# Patient Record
Sex: FEMALE | Race: WHITE | NOT HISPANIC OR LATINO | Employment: FULL TIME | URBAN - METROPOLITAN AREA
[De-identification: names, ages, dates, MRNs, and addresses within clinical notes are randomized per-mention and may not be internally consistent; named-entity substitution may affect disease eponyms.]

---

## 2017-03-30 ENCOUNTER — ALLSCRIPTS OFFICE VISIT (OUTPATIENT)
Dept: OTHER | Facility: OTHER | Age: 32
End: 2017-03-30

## 2017-10-03 ENCOUNTER — ALLSCRIPTS OFFICE VISIT (OUTPATIENT)
Dept: OTHER | Facility: OTHER | Age: 32
End: 2017-10-03

## 2017-10-03 DIAGNOSIS — E78.5 HYPERLIPIDEMIA: ICD-10-CM

## 2017-10-03 DIAGNOSIS — I10 ESSENTIAL (PRIMARY) HYPERTENSION: ICD-10-CM

## 2017-10-04 NOTE — PROGRESS NOTES
Assessment  1  Never a smoker   2  Hypertension (401 9) (I10)   3  Hyperlipidemia (272 4) (E78 5)    Plan  Hyperlipidemia    · (1) LIPID PANEL, FASTING; Status:Active; Requested OXI:[id number];   Hyperlipidemia, Hypertension    · Follow-up visit in 6 months Evaluation and Treatment  Follow-up  Status: Hold For - Scheduling   Requested for: [id number]  Hypertension    · (1) COMPREHENSIVE METABOLIC PANEL; Status:Active; Requested HJW:[id number];     Discussion/Summary  Medication SE Review and Pt Understands Tx: Possible side effects of new medications were reviewed with the patient/guardian today  The treatment plan was reviewed with the patient/guardian  The patient/guardian understands and agrees with the treatment plan       Self Referrals:   Self Referrals: No      Chief Complaint  Chief Complaint Free Text Note Form: Pt presents for routine f/up exam  PT feels well today  No refills needed at this time  appetite is normal per patient  Chief Complaint Chronic Condition [facility]: Patient is here today for follow up of chronic conditions described in HPI  Active Problems  1  Hypertension (401 9) (I10)    Past Medical History  Active Problems And Past Medical History Reviewed: The active problems and past medical history were reviewed and updated today  Surgical History  1  History of Cholecystectomy   2  History of Incision And Drainage Of Pilonidal Cyst  Surgical History Reviewed: The surgical history was reviewed and updated today  Family History  Father    1  Family history of Hypertension (V17 49)  Maternal Grandmother    2  Family history of Coronary Artery Disease (V17 49)  Paternal Grandmother    3  Family history of Alzheimer Disease  Paternal Grandfather    4  Family history of Skin Cancer (V16 8)  Family History Reviewed: The family history was reviewed and updated today         Social History   · Denied: History of Alcohol   · Caffeine Use   · Dental care, regularly   · Denied: History of Drug Use   · Good dental hygiene   · Never a smoker   · Yazidism   · Single   · Sun Protection Sunscreen   · Uses Safety Equipment - Seatbelts  Social History Reviewed: The social history was reviewed and updated today  The social history was reviewed and is unchanged  Current Meds   1  Lisinopril-Hydrochlorothiazide 20-25 MG Oral Tablet; One po BID; Therapy: [id number] to (Last Rx:21Nov2016)  Requested for: 21Nov2016 Ordered   2  Multi-Vitamin Daily Oral Tablet; Therapy: (Recorded:71Oic1405) to Recorded   3  Potassium Chloride ER 20 MEQ Oral Tablet Extended Release; Take one tablet daily for first week,   then after first week take 2-3 times weekly; Therapy: 71ZDI7638 to (Minor Delacruz)  Requested for: 41BYW1642; Last Rx:49Xmv1996   Ordered  Medication List Reviewed: The medication list was reviewed and updated today  Allergies  1  Adhesive Tape TAPE   2  Amoxicillin TABS   3  Latex Exam Gloves MISC   4   Sulfa Drugs    Vitals  Vital Signs    Recorded: 03YHE7438 08:27AM Recorded: 53ROG4985 08:18AM   Temperature  96 9 F   Heart Rate  64   Systolic 148    Diastolic 72    Height  5 ft 8 in   Weight  208 lb 6 oz   BMI Calculated  31 68   BSA Calculated  2 08   O2 Saturation  97, RA     Signatures   Electronically signed by : Lisbet Musa DO; Oct  3 2017  8:32AM EST                       (Author)

## 2018-01-12 VITALS
BODY MASS INDEX: 31.26 KG/M2 | HEIGHT: 68 IN | WEIGHT: 206.25 LBS | SYSTOLIC BLOOD PRESSURE: 126 MMHG | DIASTOLIC BLOOD PRESSURE: 78 MMHG | HEART RATE: 103 BPM | OXYGEN SATURATION: 96 % | TEMPERATURE: 97.8 F

## 2018-01-15 VITALS
HEIGHT: 68 IN | TEMPERATURE: 96.9 F | SYSTOLIC BLOOD PRESSURE: 124 MMHG | OXYGEN SATURATION: 97 % | BODY MASS INDEX: 31.58 KG/M2 | WEIGHT: 208.38 LBS | DIASTOLIC BLOOD PRESSURE: 72 MMHG | HEART RATE: 64 BPM

## 2018-01-31 DIAGNOSIS — E87.6 HYPOKALEMIA: Primary | ICD-10-CM

## 2018-01-31 RX ORDER — POTASSIUM CHLORIDE 20 MEQ/1
TABLET, EXTENDED RELEASE ORAL
Qty: 30 TABLET | Refills: 0 | Status: SHIPPED | OUTPATIENT
Start: 2018-01-31 | End: 2018-08-01 | Stop reason: SDUPTHER

## 2018-03-17 ENCOUNTER — TRANSCRIBE ORDERS (OUTPATIENT)
Dept: LAB | Facility: MEDICAL CENTER | Age: 33
End: 2018-03-17

## 2018-03-17 ENCOUNTER — APPOINTMENT (OUTPATIENT)
Dept: LAB | Facility: MEDICAL CENTER | Age: 33
End: 2018-03-17
Payer: COMMERCIAL

## 2018-03-17 DIAGNOSIS — E78.5 HYPERLIPIDEMIA: ICD-10-CM

## 2018-03-17 DIAGNOSIS — I10 ESSENTIAL (PRIMARY) HYPERTENSION: ICD-10-CM

## 2018-03-17 LAB
ALBUMIN SERPL BCP-MCNC: 4 G/DL (ref 3.5–5)
ALP SERPL-CCNC: 53 U/L (ref 46–116)
ALT SERPL W P-5'-P-CCNC: 22 U/L (ref 12–78)
ANION GAP SERPL CALCULATED.3IONS-SCNC: 5 MMOL/L (ref 4–13)
AST SERPL W P-5'-P-CCNC: 16 U/L (ref 5–45)
BILIRUB SERPL-MCNC: 0.53 MG/DL (ref 0.2–1)
BUN SERPL-MCNC: 11 MG/DL (ref 5–25)
CALCIUM SERPL-MCNC: 8.6 MG/DL (ref 8.3–10.1)
CHLORIDE SERPL-SCNC: 101 MMOL/L (ref 100–108)
CHOLEST SERPL-MCNC: 144 MG/DL (ref 50–200)
CO2 SERPL-SCNC: 32 MMOL/L (ref 21–32)
CREAT SERPL-MCNC: 0.73 MG/DL (ref 0.6–1.3)
GFR SERPL CREATININE-BSD FRML MDRD: 109 ML/MIN/1.73SQ M
GLUCOSE P FAST SERPL-MCNC: 85 MG/DL (ref 65–99)
HDLC SERPL-MCNC: 64 MG/DL (ref 40–60)
LDLC SERPL CALC-MCNC: 71 MG/DL (ref 0–100)
POTASSIUM SERPL-SCNC: 3.4 MMOL/L (ref 3.5–5.3)
PROT SERPL-MCNC: 7.6 G/DL (ref 6.4–8.2)
SODIUM SERPL-SCNC: 138 MMOL/L (ref 136–145)
TRIGL SERPL-MCNC: 44 MG/DL

## 2018-03-17 PROCEDURE — 80053 COMPREHEN METABOLIC PANEL: CPT

## 2018-03-17 PROCEDURE — 80061 LIPID PANEL: CPT

## 2018-03-17 PROCEDURE — 36415 COLL VENOUS BLD VENIPUNCTURE: CPT

## 2018-04-03 RX ORDER — LISINOPRIL AND HYDROCHLOROTHIAZIDE 25; 20 MG/1; MG/1
TABLET ORAL 2 TIMES DAILY
COMMUNITY
Start: 2015-03-17 | End: 2018-09-28 | Stop reason: SDUPTHER

## 2018-04-04 PROBLEM — E78.5 HYPERLIPIDEMIA: Status: ACTIVE | Noted: 2017-10-03

## 2018-04-05 ENCOUNTER — TELEPHONE (OUTPATIENT)
Dept: INTERNAL MEDICINE CLINIC | Facility: CLINIC | Age: 33
End: 2018-04-05

## 2018-04-05 ENCOUNTER — OFFICE VISIT (OUTPATIENT)
Dept: INTERNAL MEDICINE CLINIC | Facility: CLINIC | Age: 33
End: 2018-04-05
Payer: COMMERCIAL

## 2018-04-05 VITALS
OXYGEN SATURATION: 97 % | WEIGHT: 221.13 LBS | HEART RATE: 82 BPM | TEMPERATURE: 97.4 F | BODY MASS INDEX: 33.51 KG/M2 | DIASTOLIC BLOOD PRESSURE: 70 MMHG | HEIGHT: 68 IN | SYSTOLIC BLOOD PRESSURE: 124 MMHG

## 2018-04-05 DIAGNOSIS — I10 ESSENTIAL HYPERTENSION: Primary | ICD-10-CM

## 2018-04-05 DIAGNOSIS — E78.5 HYPERLIPIDEMIA, UNSPECIFIED HYPERLIPIDEMIA TYPE: ICD-10-CM

## 2018-04-05 PROCEDURE — 99214 OFFICE O/P EST MOD 30 MIN: CPT | Performed by: INTERNAL MEDICINE

## 2018-04-05 NOTE — PROGRESS NOTES
Assessment/Plan:     HTN  Pt stable and will continue diet and exercise  Increase fluids and take potassium daily michael in summer    Hyperlipidemia  Labs reviewed and improved  Continue present rx and regular exercise    Increase water intake    RTO 6 months          Patient ID: Rebekah Almaguer is a 29 y o  female  HPI   Pt doing well generally  Had an episode after running half marathon in October but sxs recovered quickly after hydration  No chest pain or sob  No recent illnesses  Planning to do a warrior run in the summer  Review of Systems   Constitutional: Negative  HENT: Negative  Eyes: Negative  Respiratory: Negative for shortness of breath  Cardiovascular: Negative  Negative for chest pain and palpitations  Gastrointestinal: Negative  Endocrine: Negative  Genitourinary: Negative  Musculoskeletal: Negative  Negative for neck pain  Skin: Negative  Allergic/Immunologic: Negative  Neurological: Negative  Negative for headaches  Hematological: Negative  Psychiatric/Behavioral: Negative  Vitals:    04/05/18 0835 04/05/18 0848   BP:  124/70   Pulse: 82    Temp: (!) 97 4 °F (36 3 °C)    TempSrc: Tympanic    SpO2: 97%    Weight: 100 kg (221 lb 2 oz)    Height: 5' 8" (1 727 m)         Physical Exam   Constitutional: She is oriented to person, place, and time  She appears well-developed and well-nourished  No distress  HENT:   Head: Normocephalic and atraumatic  Right Ear: External ear normal    Left Ear: External ear normal    Nose: Nose normal    Mouth/Throat: Oropharynx is clear and moist  No oropharyngeal exudate  Eyes: Conjunctivae and EOM are normal  Pupils are equal, round, and reactive to light  No scleral icterus  Neck: Normal range of motion  Neck supple  Cardiovascular: Normal rate, regular rhythm, normal heart sounds and intact distal pulses  Pulmonary/Chest: Effort normal and breath sounds normal  No respiratory distress   She has no wheezes  She has no rales  Abdominal: Soft  Bowel sounds are normal    Musculoskeletal: Normal range of motion  She exhibits no edema, tenderness or deformity  Neurological: She is alert and oriented to person, place, and time  She has normal reflexes  She displays normal reflexes  No cranial nerve deficit  She exhibits normal muscle tone  Coordination normal    Skin: Skin is warm and dry  She is not diaphoretic  Psychiatric: She has a normal mood and affect  Her behavior is normal  Judgment and thought content normal    Nursing note and vitals reviewed

## 2018-04-30 ENCOUNTER — VBI (OUTPATIENT)
Dept: INTERNAL MEDICINE CLINIC | Facility: CLINIC | Age: 33
End: 2018-04-30

## 2018-04-30 LAB — PATH REPORT.RELEVANT HX SPEC: NORMAL

## 2018-06-18 ENCOUNTER — TELEPHONE (OUTPATIENT)
Dept: INTERNAL MEDICINE CLINIC | Facility: CLINIC | Age: 33
End: 2018-06-18

## 2018-06-18 NOTE — TELEPHONE ENCOUNTER
Can she come at 26 tomorrow and I can look at it  Tellher to use compresses and take claritin or allegra today

## 2018-06-19 ENCOUNTER — OFFICE VISIT (OUTPATIENT)
Dept: INTERNAL MEDICINE CLINIC | Facility: CLINIC | Age: 33
End: 2018-06-19
Payer: COMMERCIAL

## 2018-06-19 VITALS
HEIGHT: 68 IN | WEIGHT: 218.38 LBS | OXYGEN SATURATION: 98 % | BODY MASS INDEX: 33.1 KG/M2 | HEART RATE: 64 BPM | TEMPERATURE: 98.1 F

## 2018-06-19 DIAGNOSIS — H57.9 EYE LESION: Primary | ICD-10-CM

## 2018-06-19 PROCEDURE — 3008F BODY MASS INDEX DOCD: CPT | Performed by: INTERNAL MEDICINE

## 2018-06-19 PROCEDURE — 99213 OFFICE O/P EST LOW 20 MIN: CPT | Performed by: INTERNAL MEDICINE

## 2018-06-19 NOTE — PROGRESS NOTES
Assessment/Plan:      Diagnoses and all orders for this visit:    Eye lesion  Appears to be resolving and most likely a bite albert  It is below corner of left eye and no eye issues noted at this time  Compresses prn and use claritin for a couple days in case there was local reaction      RTO prn       Patient ID: Claudell Perking is a 29 y o  female  HPI   Pt noted raised area lower left lid area below the lid line  Some discomfort but is smaller and no vision deficit  No itch  Used compress yesterday with some relief and seems better today    Review of Systems   Constitutional: Negative  Eyes: Positive for itching  Respiratory: Negative  Cardiovascular: Negative  Musculoskeletal: Negative  Hematological: Negative  Psychiatric/Behavioral: Negative  Physical Exam   Constitutional: She is oriented to person, place, and time  She appears well-developed and well-nourished  HENT:   Head: Normocephalic  Eyes: Conjunctivae and EOM are normal  Pupils are equal, round, and reactive to light  Neck: Normal range of motion  Neck supple  Cardiovascular: Normal rate and regular rhythm  Pulmonary/Chest: Effort normal and breath sounds normal    Abdominal: Soft  Bowel sounds are normal    Neurological: She is alert and oriented to person, place, and time  She has normal reflexes  Skin: Skin is warm and dry  Psychiatric: She has a normal mood and affect   Her behavior is normal  Judgment and thought content normal

## 2018-08-01 DIAGNOSIS — E87.6 HYPOKALEMIA: ICD-10-CM

## 2018-08-01 RX ORDER — POTASSIUM CHLORIDE 20 MEQ/1
TABLET, EXTENDED RELEASE ORAL
Qty: 30 TABLET | Refills: 0 | Status: SHIPPED | OUTPATIENT
Start: 2018-08-01 | End: 2018-11-27 | Stop reason: SDUPTHER

## 2018-09-10 ENCOUNTER — TELEPHONE (OUTPATIENT)
Dept: INTERNAL MEDICINE CLINIC | Facility: CLINIC | Age: 33
End: 2018-09-10

## 2018-09-10 DIAGNOSIS — L72.9 INFECTED CYST OF SKIN: Primary | ICD-10-CM

## 2018-09-10 DIAGNOSIS — L08.9 INFECTED CYST OF SKIN: Primary | ICD-10-CM

## 2018-09-10 RX ORDER — DOXYCYCLINE 100 MG/1
100 CAPSULE ORAL 2 TIMES DAILY
Qty: 14 CAPSULE | Refills: 0 | Status: SHIPPED | OUTPATIENT
Start: 2018-09-10 | End: 2018-09-17

## 2018-09-28 DIAGNOSIS — I10 ESSENTIAL HYPERTENSION: Primary | ICD-10-CM

## 2018-09-28 RX ORDER — LISINOPRIL AND HYDROCHLOROTHIAZIDE 25; 20 MG/1; MG/1
TABLET ORAL
Qty: 100 TABLET | Refills: 0 | Status: SHIPPED | OUTPATIENT
Start: 2018-09-28 | End: 2019-01-17 | Stop reason: SDUPTHER

## 2018-10-05 ENCOUNTER — OFFICE VISIT (OUTPATIENT)
Dept: INTERNAL MEDICINE CLINIC | Facility: CLINIC | Age: 33
End: 2018-10-05
Payer: COMMERCIAL

## 2018-10-05 VITALS
TEMPERATURE: 98.3 F | DIASTOLIC BLOOD PRESSURE: 70 MMHG | HEART RATE: 70 BPM | HEIGHT: 68 IN | SYSTOLIC BLOOD PRESSURE: 124 MMHG | BODY MASS INDEX: 33.25 KG/M2 | OXYGEN SATURATION: 98 % | WEIGHT: 219.38 LBS

## 2018-10-05 DIAGNOSIS — E78.5 HYPERLIPIDEMIA, UNSPECIFIED HYPERLIPIDEMIA TYPE: ICD-10-CM

## 2018-10-05 DIAGNOSIS — I10 ESSENTIAL HYPERTENSION: Primary | ICD-10-CM

## 2018-10-05 PROCEDURE — 3078F DIAST BP <80 MM HG: CPT | Performed by: INTERNAL MEDICINE

## 2018-10-05 PROCEDURE — 99214 OFFICE O/P EST MOD 30 MIN: CPT | Performed by: INTERNAL MEDICINE

## 2018-10-05 PROCEDURE — 1036F TOBACCO NON-USER: CPT | Performed by: INTERNAL MEDICINE

## 2018-10-05 PROCEDURE — 3074F SYST BP LT 130 MM HG: CPT | Performed by: INTERNAL MEDICINE

## 2018-10-05 NOTE — PROGRESS NOTES
Assessment/Plan:         Diagnoses and all orders for this visit:    Essential hypertension  No added salt  Continue present Rx    Hyperlipidemia, unspecified hyperlipidemia type  Low fat diet  Exercise encouraged    Rto 6 months     Patient ID: Ashley Singh is a 29 y o  female  HPI   Pt feels well  No chest pain or sob  Does run for exercise  Review of Systems   Constitutional: Negative  HENT: Negative  Eyes: Negative  Respiratory: Negative  Cardiovascular: Negative  Gastrointestinal: Negative  Endocrine: Negative  Genitourinary: Negative  Musculoskeletal: Negative  Skin: Negative  Allergic/Immunologic: Negative  Neurological: Negative  Hematological: Negative  Psychiatric/Behavioral: Negative  /70   Pulse 70   Temp 98 3 °F (36 8 °C) (Tympanic)   Ht 5' 8" (1 727 m)   Wt 99 5 kg (219 lb 6 oz)   SpO2 98%   BMI 33 36 kg/m²          Physical Exam   Constitutional: She is oriented to person, place, and time  She appears well-developed and well-nourished  No distress  HENT:   Head: Normocephalic and atraumatic  Right Ear: External ear normal    Left Ear: External ear normal    Nose: Nose normal    Mouth/Throat: Oropharynx is clear and moist  No oropharyngeal exudate  Eyes: Pupils are equal, round, and reactive to light  Conjunctivae and EOM are normal  No scleral icterus  Neck: Normal range of motion  Neck supple  No JVD present  Cardiovascular: Normal rate, regular rhythm, normal heart sounds and intact distal pulses  No murmur heard  Pulmonary/Chest: Effort normal and breath sounds normal    Abdominal: Soft  Bowel sounds are normal    Musculoskeletal: Normal range of motion  She exhibits no edema, tenderness or deformity  Lymphadenopathy:     She has no cervical adenopathy  Neurological: She is alert and oriented to person, place, and time  She has normal reflexes  She displays normal reflexes  No cranial nerve deficit   She exhibits normal muscle tone  Coordination normal    Skin: Skin is warm and dry  She is not diaphoretic  Psychiatric: She has a normal mood and affect  Her behavior is normal  Judgment and thought content normal    Nursing note and vitals reviewed

## 2018-11-27 DIAGNOSIS — E87.6 HYPOKALEMIA: ICD-10-CM

## 2018-11-27 RX ORDER — POTASSIUM CHLORIDE 20 MEQ/1
TABLET, EXTENDED RELEASE ORAL
Qty: 30 TABLET | Refills: 0 | Status: SHIPPED | OUTPATIENT
Start: 2018-11-27 | End: 2019-05-09 | Stop reason: SDUPTHER

## 2019-01-17 DIAGNOSIS — I10 ESSENTIAL HYPERTENSION: ICD-10-CM

## 2019-01-17 RX ORDER — LISINOPRIL AND HYDROCHLOROTHIAZIDE 25; 20 MG/1; MG/1
1 TABLET ORAL 2 TIMES DAILY
Qty: 180 TABLET | Refills: 3 | Status: SHIPPED | OUTPATIENT
Start: 2019-01-17 | End: 2019-05-24 | Stop reason: SDUPTHER

## 2019-01-17 NOTE — TELEPHONE ENCOUNTER
From: Rita Witt  Sent: 1/17/2019 2:40 PM EST  Subject: Medication Renewal Request    Rita Witt would like a refill of the following medications:     lisinopril-hydrochlorothiazide (PRINZIDE,ZESTORETIC) 20-25 MG per tablet Vikash Tariq DO]    Preferred pharmacy: [facility] : [id number]

## 2019-04-05 ENCOUNTER — OFFICE VISIT (OUTPATIENT)
Dept: INTERNAL MEDICINE CLINIC | Facility: CLINIC | Age: 34
End: 2019-04-05
Payer: COMMERCIAL

## 2019-04-05 VITALS
BODY MASS INDEX: 32.51 KG/M2 | OXYGEN SATURATION: 98 % | HEART RATE: 75 BPM | HEIGHT: 68 IN | WEIGHT: 214.5 LBS | TEMPERATURE: 95.8 F | DIASTOLIC BLOOD PRESSURE: 78 MMHG | SYSTOLIC BLOOD PRESSURE: 124 MMHG

## 2019-04-05 DIAGNOSIS — E66.9 OBESITY (BMI 30.0-34.9): ICD-10-CM

## 2019-04-05 DIAGNOSIS — E78.5 HYPERLIPIDEMIA, UNSPECIFIED HYPERLIPIDEMIA TYPE: ICD-10-CM

## 2019-04-05 DIAGNOSIS — I10 BENIGN ESSENTIAL HYPERTENSION: Primary | ICD-10-CM

## 2019-04-05 DIAGNOSIS — Z00.00 VISIT FOR PREVENTIVE HEALTH EXAMINATION: ICD-10-CM

## 2019-04-05 PROBLEM — E66.811 OBESITY (BMI 30.0-34.9): Status: ACTIVE | Noted: 2019-04-05

## 2019-04-05 PROBLEM — H57.9 EYE LESION: Status: RESOLVED | Noted: 2018-06-19 | Resolved: 2019-04-05

## 2019-04-05 PROCEDURE — 3078F DIAST BP <80 MM HG: CPT | Performed by: INTERNAL MEDICINE

## 2019-04-05 PROCEDURE — 3008F BODY MASS INDEX DOCD: CPT | Performed by: INTERNAL MEDICINE

## 2019-04-05 PROCEDURE — 1036F TOBACCO NON-USER: CPT | Performed by: INTERNAL MEDICINE

## 2019-04-05 PROCEDURE — 99214 OFFICE O/P EST MOD 30 MIN: CPT | Performed by: INTERNAL MEDICINE

## 2019-04-05 PROCEDURE — 3074F SYST BP LT 130 MM HG: CPT | Performed by: INTERNAL MEDICINE

## 2019-05-08 ENCOUNTER — TELEPHONE (OUTPATIENT)
Dept: OTHER | Facility: OTHER | Age: 34
End: 2019-05-08

## 2019-05-08 ENCOUNTER — APPOINTMENT (OUTPATIENT)
Dept: LAB | Facility: MEDICAL CENTER | Age: 34
End: 2019-05-08
Payer: COMMERCIAL

## 2019-05-08 DIAGNOSIS — I10 BENIGN ESSENTIAL HYPERTENSION: ICD-10-CM

## 2019-05-08 DIAGNOSIS — E78.5 HYPERLIPIDEMIA, UNSPECIFIED HYPERLIPIDEMIA TYPE: ICD-10-CM

## 2019-05-08 LAB
ALBUMIN SERPL BCP-MCNC: 4 G/DL (ref 3.5–5)
ALP SERPL-CCNC: 50 U/L (ref 46–116)
ALT SERPL W P-5'-P-CCNC: 20 U/L (ref 12–78)
ANION GAP SERPL CALCULATED.3IONS-SCNC: 6 MMOL/L (ref 4–13)
AST SERPL W P-5'-P-CCNC: 17 U/L (ref 5–45)
BILIRUB SERPL-MCNC: 0.79 MG/DL (ref 0.2–1)
BUN SERPL-MCNC: 14 MG/DL (ref 5–25)
CALCIUM SERPL-MCNC: 8.8 MG/DL (ref 8.3–10.1)
CHLORIDE SERPL-SCNC: 103 MMOL/L (ref 100–108)
CHOLEST SERPL-MCNC: 136 MG/DL (ref 50–200)
CO2 SERPL-SCNC: 32 MMOL/L (ref 21–32)
CREAT SERPL-MCNC: 0.76 MG/DL (ref 0.6–1.3)
GFR SERPL CREATININE-BSD FRML MDRD: 103 ML/MIN/1.73SQ M
GLUCOSE P FAST SERPL-MCNC: 84 MG/DL (ref 65–99)
HDLC SERPL-MCNC: 52 MG/DL (ref 40–60)
LDLC SERPL CALC-MCNC: 72 MG/DL (ref 0–100)
NONHDLC SERPL-MCNC: 84 MG/DL
POTASSIUM SERPL-SCNC: 2.7 MMOL/L (ref 3.5–5.3)
PROT SERPL-MCNC: 7.2 G/DL (ref 6.4–8.2)
SODIUM SERPL-SCNC: 141 MMOL/L (ref 136–145)
TRIGL SERPL-MCNC: 58 MG/DL

## 2019-05-08 PROCEDURE — 80053 COMPREHEN METABOLIC PANEL: CPT

## 2019-05-08 PROCEDURE — 80061 LIPID PANEL: CPT

## 2019-05-08 PROCEDURE — 36415 COLL VENOUS BLD VENIPUNCTURE: CPT

## 2019-05-09 ENCOUNTER — TELEPHONE (OUTPATIENT)
Dept: INTERNAL MEDICINE CLINIC | Facility: CLINIC | Age: 34
End: 2019-05-09

## 2019-05-09 DIAGNOSIS — E87.6 LOW BLOOD POTASSIUM: Primary | ICD-10-CM

## 2019-05-09 DIAGNOSIS — E87.6 HYPOKALEMIA: ICD-10-CM

## 2019-05-09 RX ORDER — POTASSIUM CHLORIDE 20 MEQ/1
TABLET, EXTENDED RELEASE ORAL
Qty: 30 TABLET | Refills: 5 | Status: SHIPPED | OUTPATIENT
Start: 2019-05-09 | End: 2019-05-24 | Stop reason: SDUPTHER

## 2019-05-13 ENCOUNTER — APPOINTMENT (OUTPATIENT)
Dept: LAB | Facility: MEDICAL CENTER | Age: 34
End: 2019-05-13
Payer: COMMERCIAL

## 2019-05-13 DIAGNOSIS — E87.6 LOW BLOOD POTASSIUM: ICD-10-CM

## 2019-05-13 LAB — POTASSIUM SERPL-SCNC: 3 MMOL/L (ref 3.5–5.3)

## 2019-05-13 PROCEDURE — 36415 COLL VENOUS BLD VENIPUNCTURE: CPT

## 2019-05-13 PROCEDURE — 84132 ASSAY OF SERUM POTASSIUM: CPT

## 2019-05-14 ENCOUNTER — TELEPHONE (OUTPATIENT)
Dept: INTERNAL MEDICINE CLINIC | Facility: CLINIC | Age: 34
End: 2019-05-14

## 2019-05-24 DIAGNOSIS — E87.6 HYPOKALEMIA: ICD-10-CM

## 2019-05-24 DIAGNOSIS — I10 ESSENTIAL HYPERTENSION: ICD-10-CM

## 2019-05-24 RX ORDER — POTASSIUM CHLORIDE 20 MEQ/1
20 TABLET, EXTENDED RELEASE ORAL 2 TIMES DAILY
Qty: 180 TABLET | Refills: 3 | Status: SHIPPED | OUTPATIENT
Start: 2019-05-24 | End: 2019-05-28 | Stop reason: SDUPTHER

## 2019-05-24 RX ORDER — LISINOPRIL AND HYDROCHLOROTHIAZIDE 25; 20 MG/1; MG/1
1 TABLET ORAL 2 TIMES DAILY
Qty: 180 TABLET | Refills: 3 | Status: SHIPPED | OUTPATIENT
Start: 2019-05-24 | End: 2020-04-17

## 2019-05-28 DIAGNOSIS — E87.6 HYPOKALEMIA: ICD-10-CM

## 2019-05-28 RX ORDER — POTASSIUM CHLORIDE 20 MEQ/1
20 TABLET, EXTENDED RELEASE ORAL 2 TIMES DAILY
Qty: 180 TABLET | Refills: 3 | Status: SHIPPED | OUTPATIENT
Start: 2019-05-28 | End: 2020-04-21

## 2019-10-04 ENCOUNTER — OFFICE VISIT (OUTPATIENT)
Dept: INTERNAL MEDICINE CLINIC | Facility: CLINIC | Age: 34
End: 2019-10-04
Payer: COMMERCIAL

## 2019-10-04 VITALS
TEMPERATURE: 97.4 F | WEIGHT: 206.13 LBS | BODY MASS INDEX: 31.24 KG/M2 | OXYGEN SATURATION: 98 % | DIASTOLIC BLOOD PRESSURE: 78 MMHG | HEIGHT: 68 IN | SYSTOLIC BLOOD PRESSURE: 124 MMHG | HEART RATE: 68 BPM

## 2019-10-04 DIAGNOSIS — Z00.00 ENCOUNTER FOR PREVENTIVE HEALTH EXAMINATION: Primary | ICD-10-CM

## 2019-10-04 DIAGNOSIS — I10 ESSENTIAL HYPERTENSION: ICD-10-CM

## 2019-10-04 PROCEDURE — 3078F DIAST BP <80 MM HG: CPT | Performed by: INTERNAL MEDICINE

## 2019-10-04 PROCEDURE — 3074F SYST BP LT 130 MM HG: CPT | Performed by: INTERNAL MEDICINE

## 2019-10-04 PROCEDURE — 3008F BODY MASS INDEX DOCD: CPT | Performed by: INTERNAL MEDICINE

## 2019-10-04 PROCEDURE — 99214 OFFICE O/P EST MOD 30 MIN: CPT | Performed by: INTERNAL MEDICINE

## 2019-10-04 NOTE — PROGRESS NOTES
Assessment/Plan:     Preventive Health Exam  Gyn appt in January  Rx for fbw  Exercise/low sodium diet  Increase water intake  MVI daily  Deferred flu shot    Rto 6 months     Patient ID: Amaury Hensley is a 36 y o  female  HPI   Pt generally well No acute issues She did get Gyn appt for January  No chest pain or sob She is running at times and has a 5k upcoming No limiting sxs She generally feels well    Review of Systems   Constitutional: Negative  HENT: Negative  Respiratory: Negative  Cardiovascular: Negative  Gastrointestinal: Negative  Genitourinary: Negative  Musculoskeletal: Negative  Skin: Negative  Neurological: Negative  Hematological: Negative  Psychiatric/Behavioral: Negative  No past medical history on file    Past Surgical History:   Procedure Laterality Date    CHOLECYSTECTOMY  2005    PILONIDAL CYST DRAINAGE      incision and drainage; unknown     Social History     Socioeconomic History    Marital status: Single     Spouse name: Not on file    Number of children: Not on file    Years of education: Not on file    Highest education level: Not on file   Occupational History    Not on file   Social Needs    Financial resource strain: Not on file    Food insecurity:     Worry: Not on file     Inability: Not on file    Transportation needs:     Medical: Not on file     Non-medical: Not on file   Tobacco Use    Smoking status: Never Smoker    Smokeless tobacco: Never Used   Substance and Sexual Activity    Alcohol use: No    Drug use: No    Sexual activity: Not on file   Lifestyle    Physical activity:     Days per week: Not on file     Minutes per session: Not on file    Stress: Not on file   Relationships    Social connections:     Talks on phone: Not on file     Gets together: Not on file     Attends Denominational service: Not on file     Active member of club or organization: Not on file     Attends meetings of clubs or organizations: Not on file Relationship status: Not on file    Intimate partner violence:     Fear of current or ex partner: Not on file     Emotionally abused: Not on file     Physically abused: Not on file     Forced sexual activity: Not on file   Other Topics Concern    Not on file   Social History Narrative    Caffeine use    Dental care, regularly    Good dental hygiene    Gnosticism    Sun protection sunscreen    Uses safety equipment-seatbelts     Allergies   Allergen Reactions    Amoxicillin     Latex     Other      Adhesive Tape    Sulfa Antibiotics          Vitals:    10/04/19 0803 10/04/19 0819   BP:  124/78   Pulse: 68    Temp: (!) 97 4 °F (36 3 °C)    TempSrc: Tympanic    SpO2: 98%    Weight: 93 5 kg (206 lb 2 oz)    Height: 5' 8" (1 727 m)           Physical Exam   Constitutional: She is oriented to person, place, and time  She appears well-developed and well-nourished  No distress  HENT:   Head: Normocephalic and atraumatic  Right Ear: External ear normal    Left Ear: External ear normal    Nose: Nose normal    Mouth/Throat: Oropharynx is clear and moist  No oropharyngeal exudate  Eyes: Pupils are equal, round, and reactive to light  Conjunctivae and EOM are normal  No scleral icterus  Neck: Normal range of motion  Neck supple  No JVD present  Cardiovascular: Normal rate and regular rhythm  No murmur heard  Pulmonary/Chest: Effort normal and breath sounds normal  No respiratory distress  She has no wheezes  Abdominal: Soft  Bowel sounds are normal  She exhibits no distension  There is no tenderness  Musculoskeletal: Normal range of motion  She exhibits no edema  Lymphadenopathy:     She has no cervical adenopathy  Neurological: She is alert and oriented to person, place, and time  She displays normal reflexes  No cranial nerve deficit  She exhibits normal muscle tone  Coordination normal    Skin: Skin is warm and dry  Capillary refill takes less than 2 seconds  She is not diaphoretic     Psychiatric: She has a normal mood and affect  Her behavior is normal  Judgment and thought content normal    Nursing note and vitals reviewed

## 2019-12-03 NOTE — TELEPHONE ENCOUNTER
----- Message from Foreign Cowan DO sent at 12/3/2019  5:57 PM EST -----  Regarding: FW: Prescription Question  Contact: [phone]  uSjeychristel Yanirada to send rx for bleph 10 eye drops 2 drops tid   ----- Message -----  From: Patrick Downing  Sent: 12/3/2019   5:43 PM EST  To: Foreign Cowan DO  Subject: FW: Prescription Question                            ----- Message -----  From: Jeimy Mcclain  Sent: 12/3/2019   5:31 PM EST  To: [facility]  Subject: Prescription Question                            I have had a stye for the past week or two  I thought it would go away on it's own   and it is much better, but I am going on vacation next week and I don't want to have any problems with it while I am away  Is it possible to have a prescription called in for it? I'm sorry I wasn't able to call during office hours to ask about this  Thanks!   Madhu Vargas

## 2019-12-18 ENCOUNTER — TELEPHONE (OUTPATIENT)
Dept: OBGYN CLINIC | Facility: MEDICAL CENTER | Age: 34
End: 2019-12-18

## 2020-04-17 DIAGNOSIS — I10 ESSENTIAL HYPERTENSION: ICD-10-CM

## 2020-04-17 RX ORDER — LISINOPRIL AND HYDROCHLOROTHIAZIDE 25; 20 MG/1; MG/1
TABLET ORAL
Qty: 180 TABLET | Refills: 3 | Status: SHIPPED | OUTPATIENT
Start: 2020-04-17 | End: 2021-05-02

## 2020-04-21 DIAGNOSIS — E87.6 HYPOKALEMIA: ICD-10-CM

## 2020-04-21 RX ORDER — POTASSIUM CHLORIDE 20 MEQ/1
TABLET, EXTENDED RELEASE ORAL
Qty: 180 TABLET | Refills: 3 | Status: SHIPPED | OUTPATIENT
Start: 2020-04-21 | End: 2021-04-16

## 2020-05-16 ENCOUNTER — OFFICE VISIT (OUTPATIENT)
Dept: URGENT CARE | Facility: CLINIC | Age: 35
End: 2020-05-16
Payer: COMMERCIAL

## 2020-05-16 VITALS
HEART RATE: 87 BPM | WEIGHT: 206 LBS | TEMPERATURE: 99 F | BODY MASS INDEX: 31.22 KG/M2 | SYSTOLIC BLOOD PRESSURE: 148 MMHG | RESPIRATION RATE: 16 BRPM | DIASTOLIC BLOOD PRESSURE: 96 MMHG | HEIGHT: 68 IN | OXYGEN SATURATION: 100 %

## 2020-05-16 DIAGNOSIS — Z23 NEED FOR VACCINATION: ICD-10-CM

## 2020-05-16 DIAGNOSIS — S93.402A SPRAIN OF LEFT ANKLE, UNSPECIFIED LIGAMENT, INITIAL ENCOUNTER: ICD-10-CM

## 2020-05-16 DIAGNOSIS — S80.211A ABRASION, RIGHT KNEE, INITIAL ENCOUNTER: Primary | ICD-10-CM
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
## 2020-05-16 PROCEDURE — 90715 TDAP VACCINE 7 YRS/> IM: CPT | Performed by: PHYSICIAN ASSISTANT

## 2020-05-16 PROCEDURE — 99213 OFFICE O/P EST LOW 20 MIN: CPT | Performed by: PHYSICIAN ASSISTANT

## 2020-05-16 PROCEDURE — 90471 IMMUNIZATION ADMIN: CPT | Performed by: PHYSICIAN ASSISTANT

## 2020-05-16 PROCEDURE — 90715 TDAP VACCINE 7 YRS/> IM: CPT

## 2020-05-21 ENCOUNTER — OFFICE VISIT (OUTPATIENT)
Dept: INTERNAL MEDICINE CLINIC | Facility: CLINIC | Age: 35
End: 2020-05-21
Payer: COMMERCIAL

## 2020-05-21 VITALS
OXYGEN SATURATION: 98 % | DIASTOLIC BLOOD PRESSURE: 78 MMHG | TEMPERATURE: 98.5 F | SYSTOLIC BLOOD PRESSURE: 128 MMHG | BODY MASS INDEX: 37.17 KG/M2 | WEIGHT: 245.25 LBS | HEIGHT: 68 IN | HEART RATE: 66 BPM

## 2020-05-21 DIAGNOSIS — I10 BENIGN ESSENTIAL HYPERTENSION: ICD-10-CM

## 2020-05-21 DIAGNOSIS — S80.811D INFECTED ABRASION OF RIGHT LOWER EXTREMITY, SUBSEQUENT ENCOUNTER: Primary | ICD-10-CM

## 2020-05-21 DIAGNOSIS — L08.9 INFECTED ABRASION OF RIGHT LOWER EXTREMITY, SUBSEQUENT ENCOUNTER: Primary | ICD-10-CM

## 2020-05-21 PROBLEM — S80.811A INFECTED ABRASION OF RIGHT LEG: Status: ACTIVE | Noted: 2020-05-21

## 2020-05-21 PROCEDURE — 3074F SYST BP LT 130 MM HG: CPT | Performed by: INTERNAL MEDICINE

## 2020-05-21 PROCEDURE — 3078F DIAST BP <80 MM HG: CPT | Performed by: INTERNAL MEDICINE

## 2020-05-21 PROCEDURE — 3008F BODY MASS INDEX DOCD: CPT | Performed by: INTERNAL MEDICINE

## 2020-05-21 PROCEDURE — 99213 OFFICE O/P EST LOW 20 MIN: CPT | Performed by: INTERNAL MEDICINE

## 2020-05-21 PROCEDURE — 1036F TOBACCO NON-USER: CPT | Performed by: INTERNAL MEDICINE

## 2020-05-21 RX ORDER — DOXYCYCLINE HYCLATE 100 MG/1
100 CAPSULE ORAL EVERY 12 HOURS SCHEDULED
Qty: 14 CAPSULE | Refills: 0 | Status: SHIPPED | OUTPATIENT
Start: 2020-05-21 | End: 2020-05-28

## 2020-05-29 ENCOUNTER — TELEMEDICINE (OUTPATIENT)
Dept: INTERNAL MEDICINE CLINIC | Facility: CLINIC | Age: 35
End: 2020-05-29
Payer: COMMERCIAL

## 2020-05-29 ENCOUNTER — TELEPHONE (OUTPATIENT)
Dept: OTHER | Facility: OTHER | Age: 35
End: 2020-05-29

## 2020-05-29 DIAGNOSIS — S80.811D INFECTED ABRASION OF RIGHT LOWER EXTREMITY, SUBSEQUENT ENCOUNTER: Primary | ICD-10-CM

## 2020-05-29 DIAGNOSIS — L08.9 INFECTED ABRASION OF RIGHT LOWER EXTREMITY, SUBSEQUENT ENCOUNTER: Primary | ICD-10-CM

## 2020-05-29 PROCEDURE — 99213 OFFICE O/P EST LOW 20 MIN: CPT | Performed by: INTERNAL MEDICINE

## 2020-06-02 DIAGNOSIS — L08.9 WOUND INFECTION: Primary | ICD-10-CM

## 2020-06-02 DIAGNOSIS — T14.8XXA WOUND INFECTION: Primary | ICD-10-CM

## 2020-06-02 RX ORDER — DOXYCYCLINE HYCLATE 100 MG/1
100 CAPSULE ORAL EVERY 12 HOURS SCHEDULED
Qty: 14 CAPSULE | Refills: 0 | Status: SHIPPED | OUTPATIENT
Start: 2020-06-02 | End: 2020-06-05 | Stop reason: SDUPTHER

## 2020-06-05 ENCOUNTER — OFFICE VISIT (OUTPATIENT)
Dept: INTERNAL MEDICINE CLINIC | Facility: CLINIC | Age: 35
End: 2020-06-05
Payer: COMMERCIAL

## 2020-06-05 VITALS
TEMPERATURE: 99.6 F | SYSTOLIC BLOOD PRESSURE: 124 MMHG | BODY MASS INDEX: 37.19 KG/M2 | HEIGHT: 68 IN | DIASTOLIC BLOOD PRESSURE: 78 MMHG | WEIGHT: 245.38 LBS | HEART RATE: 87 BPM | OXYGEN SATURATION: 97 %

## 2020-06-05 DIAGNOSIS — T14.8XXA WOUND INFECTION: ICD-10-CM

## 2020-06-05 DIAGNOSIS — E78.5 HYPERLIPIDEMIA, UNSPECIFIED HYPERLIPIDEMIA TYPE: ICD-10-CM

## 2020-06-05 DIAGNOSIS — Z00.00 ANNUAL PHYSICAL EXAM: ICD-10-CM

## 2020-06-05 DIAGNOSIS — Z00.00 VISIT FOR PREVENTIVE HEALTH EXAMINATION: Primary | ICD-10-CM

## 2020-06-05 DIAGNOSIS — L08.9 WOUND INFECTION: ICD-10-CM

## 2020-06-05 PROCEDURE — 99395 PREV VISIT EST AGE 18-39: CPT | Performed by: INTERNAL MEDICINE

## 2020-06-05 RX ORDER — DOXYCYCLINE HYCLATE 100 MG/1
100 CAPSULE ORAL EVERY 12 HOURS SCHEDULED
Qty: 14 CAPSULE | Refills: 0 | Status: SHIPPED | OUTPATIENT
Start: 2020-06-05 | End: 2020-06-12

## 2020-06-09 ENCOUNTER — APPOINTMENT (OUTPATIENT)
Dept: LAB | Facility: MEDICAL CENTER | Age: 35
End: 2020-06-09
Payer: COMMERCIAL

## 2020-06-09 DIAGNOSIS — I10 ESSENTIAL HYPERTENSION: ICD-10-CM

## 2020-06-09 DIAGNOSIS — E78.5 HYPERLIPIDEMIA, UNSPECIFIED HYPERLIPIDEMIA TYPE: ICD-10-CM

## 2020-06-09 LAB
ALBUMIN SERPL BCP-MCNC: 4 G/DL (ref 3.5–5)
ALP SERPL-CCNC: 59 U/L (ref 46–116)
ALT SERPL W P-5'-P-CCNC: 57 U/L (ref 12–78)
ANION GAP SERPL CALCULATED.3IONS-SCNC: 6 MMOL/L (ref 4–13)
AST SERPL W P-5'-P-CCNC: 32 U/L (ref 5–45)
BILIRUB SERPL-MCNC: 0.63 MG/DL (ref 0.2–1)
BUN SERPL-MCNC: 10 MG/DL (ref 5–25)
CALCIUM SERPL-MCNC: 9 MG/DL (ref 8.3–10.1)
CHLORIDE SERPL-SCNC: 101 MMOL/L (ref 100–108)
CHOLEST SERPL-MCNC: 144 MG/DL (ref 50–200)
CO2 SERPL-SCNC: 31 MMOL/L (ref 21–32)
CREAT SERPL-MCNC: 0.69 MG/DL (ref 0.6–1.3)
GFR SERPL CREATININE-BSD FRML MDRD: 114 ML/MIN/1.73SQ M
GLUCOSE P FAST SERPL-MCNC: 88 MG/DL (ref 65–99)
HDLC SERPL-MCNC: 58 MG/DL
LDLC SERPL CALC-MCNC: 71 MG/DL (ref 0–100)
LDLC SERPL DIRECT ASSAY-MCNC: 74 MG/DL (ref 0–100)
NONHDLC SERPL-MCNC: 86 MG/DL
POTASSIUM SERPL-SCNC: 2.9 MMOL/L (ref 3.5–5.3)
PROT SERPL-MCNC: 7.6 G/DL (ref 6.4–8.2)
SODIUM SERPL-SCNC: 138 MMOL/L (ref 136–145)
TRIGL SERPL-MCNC: 73 MG/DL

## 2020-06-09 PROCEDURE — 83721 ASSAY OF BLOOD LIPOPROTEIN: CPT

## 2020-06-09 PROCEDURE — 36415 COLL VENOUS BLD VENIPUNCTURE: CPT

## 2020-06-09 PROCEDURE — 80053 COMPREHEN METABOLIC PANEL: CPT

## 2020-06-09 PROCEDURE — 80061 LIPID PANEL: CPT

## 2020-06-10 ENCOUNTER — TELEPHONE (OUTPATIENT)
Dept: INTERNAL MEDICINE CLINIC | Facility: CLINIC | Age: 35
End: 2020-06-10

## 2020-06-10 DIAGNOSIS — E87.6 HYPOKALEMIA: Primary | ICD-10-CM

## 2020-06-19 ENCOUNTER — APPOINTMENT (OUTPATIENT)
Dept: LAB | Facility: MEDICAL CENTER | Age: 35
End: 2020-06-19
Payer: COMMERCIAL

## 2020-06-19 DIAGNOSIS — E87.6 HYPOKALEMIA: ICD-10-CM

## 2020-06-19 LAB — POTASSIUM SERPL-SCNC: 3.4 MMOL/L (ref 3.5–5.3)

## 2020-06-19 PROCEDURE — 84132 ASSAY OF SERUM POTASSIUM: CPT

## 2020-06-19 PROCEDURE — 36415 COLL VENOUS BLD VENIPUNCTURE: CPT

## 2020-07-06 ENCOUNTER — OFFICE VISIT (OUTPATIENT)
Dept: OBGYN CLINIC | Facility: CLINIC | Age: 35
End: 2020-07-06
Payer: COMMERCIAL

## 2020-07-06 VITALS
BODY MASS INDEX: 36.83 KG/M2 | HEIGHT: 68 IN | DIASTOLIC BLOOD PRESSURE: 100 MMHG | SYSTOLIC BLOOD PRESSURE: 130 MMHG | WEIGHT: 243 LBS

## 2020-07-06 DIAGNOSIS — Z01.419 WOMEN'S ANNUAL ROUTINE GYNECOLOGICAL EXAMINATION: Primary | ICD-10-CM

## 2020-07-06 PROCEDURE — 3008F BODY MASS INDEX DOCD: CPT | Performed by: OBSTETRICS & GYNECOLOGY

## 2020-07-06 PROCEDURE — 3080F DIAST BP >= 90 MM HG: CPT | Performed by: OBSTETRICS & GYNECOLOGY

## 2020-07-06 PROCEDURE — 3075F SYST BP GE 130 - 139MM HG: CPT | Performed by: OBSTETRICS & GYNECOLOGY

## 2020-07-06 PROCEDURE — 99385 PREV VISIT NEW AGE 18-39: CPT | Performed by: OBSTETRICS & GYNECOLOGY

## 2020-07-06 PROCEDURE — G0145 SCR C/V CYTO,THINLAYER,RESCR: HCPCS | Performed by: OBSTETRICS & GYNECOLOGY

## 2020-07-06 PROCEDURE — 87624 HPV HI-RISK TYP POOLED RSLT: CPT | Performed by: OBSTETRICS & GYNECOLOGY

## 2020-07-06 NOTE — PROGRESS NOTES
A/P    1  Annual exam    Last PAP 5/2015 Care everywhere    Next due today only the brush secondary to not being  able to tolerate the speculum    Scheduling of pap discussed in detail - 5 years       30 y  o po ,Patient's last menstrual period was 06/22/2020 (approximate)  Not sexually active  No contraception     C/O no gyn complaints  Normal cycles no pain or discharge       Past medical / social / surgical / family history reviewed and updated   Medication and allergies discussed in detail and updated     Review of Systems - History obtained from chart review and the patient  General ROS: negative  Psychological ROS: negative  Ophthalmic ROS: negative  ENT ROS: negative  Allergy and Immunology ROS: negative  Hematological and Lymphatic ROS: negative  Endocrine ROS: negative  Breast ROS: negative for breast lumps  Respiratory ROS: no cough, shortness of breath, or wheezing  Cardiovascular ROS: no chest pain or dyspnea on exertion  Gastrointestinal ROS: no abdominal pain, change in bowel habits, or black or bloody stools  Genito-Urinary ROS: no dysuria, trouble voiding, or hematuria  Musculoskeletal ROS: negative  Neurological ROS: no TIA or stroke symptoms  Dermatological ROS: negative        Physical Exam   Constitutional: She is oriented to person, place, and time  She appears well-developed  Eyes: EOM are normal    Neck: Normal range of motion  No thyromegaly present  Cardiovascular: Normal rate and normal heart sounds  Pulmonary/Chest: Effort normal  No accessory muscle usage  No respiratory distress  She exhibits no tenderness  Right breast exhibits no inverted nipple, no mass and no tenderness  Left breast exhibits no inverted nipple, no mass and no tenderness  No breast tenderness or discharge  Breasts are symmetrical    Abdominal: Soft  She exhibits no distension  There is no tenderness  There is no rebound, no guarding and no CVA tenderness     Genitourinary: Vagina normal and uterus normal  Rectal exam shows no external hemorrhoid  No breast tenderness or discharge  Pelvic exam was performed with patient supine  No labial fusion  There is no rash, tenderness, lesion or injury on the right labia  There is no rash, tenderness, lesion or injury on the left labia  Uterus is not enlarged and not fixed  Cervix exhibits no motion tenderness and no discharge  Right adnexum displays no mass, no tenderness and no fullness  Left adnexum displays no mass, no tenderness and no fullness  No bleeding in the vagina  No foreign body in the vagina  No vaginal discharge found  Lymphadenopathy:     She has no cervical adenopathy  Right: No inguinal and no supraclavicular adenopathy present  Left: No inguinal and no supraclavicular adenopathy present  Neurological: She is alert and oriented to person, place, and time  Skin: Skin is intact  Psychiatric: She has a normal mood and affect  Her speech is normal and behavior is normal  Judgment and thought content normal    Vitals reviewed

## 2020-07-10 LAB
HPV HR 12 DNA CVX QL NAA+PROBE: NEGATIVE
HPV16 DNA CVX QL NAA+PROBE: NEGATIVE
HPV18 DNA CVX QL NAA+PROBE: NEGATIVE
LAB AP GYN PRIMARY INTERPRETATION: NORMAL
Lab: NORMAL

## 2020-11-01 ENCOUNTER — OFFICE VISIT (OUTPATIENT)
Dept: URGENT CARE | Facility: CLINIC | Age: 35
End: 2020-11-01
Payer: COMMERCIAL

## 2020-11-01 ENCOUNTER — NURSE TRIAGE (OUTPATIENT)
Dept: OTHER | Facility: OTHER | Age: 35
End: 2020-11-01

## 2020-11-01 VITALS — TEMPERATURE: 99.2 F | OXYGEN SATURATION: 98 % | HEART RATE: 88 BPM | RESPIRATION RATE: 18 BRPM

## 2020-11-01 DIAGNOSIS — U07.1 COVID-19 DETERMINED BY CLINICAL DIAGNOSTIC CRITERIA: Primary | ICD-10-CM

## 2020-11-01 DIAGNOSIS — Z11.59 SCREENING FOR VIRAL DISEASE: Primary | ICD-10-CM

## 2020-11-01 DIAGNOSIS — J06.9 VIRAL UPPER RESPIRATORY TRACT INFECTION: ICD-10-CM

## 2020-11-01 PROCEDURE — 99213 OFFICE O/P EST LOW 20 MIN: CPT | Performed by: PHYSICIAN ASSISTANT

## 2020-11-01 PROCEDURE — U0003 INFECTIOUS AGENT DETECTION BY NUCLEIC ACID (DNA OR RNA); SEVERE ACUTE RESPIRATORY SYNDROME CORONAVIRUS 2 (SARS-COV-2) (CORONAVIRUS DISEASE [COVID-19]), AMPLIFIED PROBE TECHNIQUE, MAKING USE OF HIGH THROUGHPUT TECHNOLOGIES AS DESCRIBED BY CMS-2020-01-R: HCPCS | Performed by: PHYSICIAN ASSISTANT

## 2020-11-03 LAB — SARS-COV-2 RNA SPEC QL NAA+PROBE: NOT DETECTED

## 2020-12-10 ENCOUNTER — OFFICE VISIT (OUTPATIENT)
Dept: INTERNAL MEDICINE CLINIC | Facility: CLINIC | Age: 35
End: 2020-12-10
Payer: COMMERCIAL

## 2020-12-10 VITALS
BODY MASS INDEX: 37 KG/M2 | DIASTOLIC BLOOD PRESSURE: 78 MMHG | TEMPERATURE: 97.6 F | HEIGHT: 68 IN | WEIGHT: 244.13 LBS | SYSTOLIC BLOOD PRESSURE: 124 MMHG

## 2020-12-10 DIAGNOSIS — I10 BENIGN ESSENTIAL HYPERTENSION: Primary | ICD-10-CM

## 2020-12-10 DIAGNOSIS — E78.5 HYPERLIPIDEMIA, UNSPECIFIED HYPERLIPIDEMIA TYPE: ICD-10-CM

## 2020-12-10 PROBLEM — E66.811 OBESITY (BMI 30.0-34.9): Status: RESOLVED | Noted: 2019-04-05 | Resolved: 2020-12-10

## 2020-12-10 PROBLEM — E66.9 OBESITY (BMI 30.0-34.9): Status: RESOLVED | Noted: 2019-04-05 | Resolved: 2020-12-10

## 2020-12-10 PROCEDURE — 99214 OFFICE O/P EST MOD 30 MIN: CPT | Performed by: INTERNAL MEDICINE

## 2020-12-10 PROCEDURE — 3078F DIAST BP <80 MM HG: CPT | Performed by: INTERNAL MEDICINE

## 2020-12-10 PROCEDURE — 1036F TOBACCO NON-USER: CPT | Performed by: INTERNAL MEDICINE

## 2020-12-10 PROCEDURE — 3725F SCREEN DEPRESSION PERFORMED: CPT | Performed by: INTERNAL MEDICINE

## 2020-12-10 PROCEDURE — 3074F SYST BP LT 130 MM HG: CPT | Performed by: INTERNAL MEDICINE

## 2020-12-10 PROCEDURE — 3008F BODY MASS INDEX DOCD: CPT | Performed by: INTERNAL MEDICINE

## 2021-04-16 DIAGNOSIS — E87.6 HYPOKALEMIA: ICD-10-CM

## 2021-04-16 RX ORDER — POTASSIUM CHLORIDE 20 MEQ/1
20 TABLET, EXTENDED RELEASE ORAL DAILY
Qty: 90 TABLET | Refills: 3 | Status: SHIPPED | OUTPATIENT
Start: 2021-04-16 | End: 2022-04-11

## 2021-05-02 DIAGNOSIS — I10 ESSENTIAL HYPERTENSION: ICD-10-CM

## 2021-05-02 RX ORDER — LISINOPRIL AND HYDROCHLOROTHIAZIDE 25; 20 MG/1; MG/1
TABLET ORAL
Qty: 180 TABLET | Refills: 3 | Status: SHIPPED | OUTPATIENT
Start: 2021-05-02 | End: 2022-04-27

## 2021-05-10 ENCOUNTER — TELEPHONE (OUTPATIENT)
Dept: INTERNAL MEDICINE CLINIC | Facility: CLINIC | Age: 36
End: 2021-05-10

## 2021-05-10 ENCOUNTER — TELEMEDICINE (OUTPATIENT)
Dept: INTERNAL MEDICINE CLINIC | Facility: CLINIC | Age: 36
End: 2021-05-10
Payer: COMMERCIAL

## 2021-05-10 DIAGNOSIS — B34.9 VIRAL INFECTION, UNSPECIFIED: Primary | ICD-10-CM

## 2021-05-10 PROBLEM — L08.9 INFECTED ABRASION OF RIGHT LEG: Status: RESOLVED | Noted: 2020-05-21 | Resolved: 2021-05-10

## 2021-05-10 PROBLEM — S80.811A INFECTED ABRASION OF RIGHT LEG: Status: RESOLVED | Noted: 2020-05-21 | Resolved: 2021-05-10

## 2021-05-10 PROCEDURE — U0003 INFECTIOUS AGENT DETECTION BY NUCLEIC ACID (DNA OR RNA); SEVERE ACUTE RESPIRATORY SYNDROME CORONAVIRUS 2 (SARS-COV-2) (CORONAVIRUS DISEASE [COVID-19]), AMPLIFIED PROBE TECHNIQUE, MAKING USE OF HIGH THROUGHPUT TECHNOLOGIES AS DESCRIBED BY CMS-2020-01-R: HCPCS | Performed by: INTERNAL MEDICINE

## 2021-05-10 PROCEDURE — 99213 OFFICE O/P EST LOW 20 MIN: CPT | Performed by: INTERNAL MEDICINE

## 2021-05-10 PROCEDURE — U0005 INFEC AGEN DETEC AMPLI PROBE: HCPCS | Performed by: INTERNAL MEDICINE

## 2021-05-10 NOTE — PROGRESS NOTES
COVID-19 Outpatient Progress Note    Assessment/Plan:    Problem List Items Addressed This Visit     None      Visit Diagnoses     Viral infection, unspecified    -  Primary    Relevant Orders    Novel Coronavirus (Covid-19),PCR [facility] - Collected in Office       Swab today  She is quarantining as sxs are suspicious for covid and she is unvaccinated at this time  Increase fluids, alternate Tylenol and ibuprofen for headache, rest  Will call pt when results available She did test at [facility] yesterday and results Pending   Disposition:     I recommended the patient to come to our office to perform PCR testing for COVID-19  I have spent 10 minutes directly with the patient  Greater than 50% of this time was spent in counseling/coordination of care regarding: diagnostic results, prognosis, risks and benefits of treatment options and instructions for management  Encounter provider Cass Van DO    Provider located at [address] [place] [id number]    Recent Visits  No visits were found meeting these conditions  Showing recent visits within past 7 days and meeting all other requirements     Today's Visits  Date Type Provider Dept   05/10/21 Telemedicine Cass Van DO [facility]   05/10/21 Telephone [address] [facility] [address] [place] [address] today's visits and meeting all other requirements     Future Appointments  No visits were found meeting these conditions  Showing future appointments within next 150 days and meeting all other requirements        Patient agrees to participate in a virtual check in via telephone or video visit instead of presenting to the office to address urgent/immediate medical needs  Patient is aware this is a billable service  After connecting through Telephone, the patient was identified by name and date of birth   Josephreta Arzola was informed that this was a telemedicine visit and that the exam was being conducted confidentially over secure lines  My office door was closed  No one else was in the room  Facundo Cobos acknowledged consent and understanding of privacy and security of the telemedicine visit  I informed the patient that I have reviewed her record in Epic and presented the opportunity for her to ask any questions regarding the visit today  The patient agreed to participate  It was my intent to perform this visit via video technology but the patient was not able to do a video connection so the visit was completed via audio telephone only  Subjective:   Facundo Cobos is a 29 y o  female who is concerned about COVID-19  Patient's symptoms include fever, fatigue and headache  Patient denies rhinorrhea, sore throat, anosmia, loss of taste, cough, shortness of breath and chest tightness       Date of symptom onset: 5/8/2021    Exposure:   Contact with a person who is under investigation (PUI) for or who is positive for COVID-19 within the last 14 days?: No    Hospitalized recently for fever and/or lower respiratory symptoms?: No      Currently a healthcare worker that is involved in direct patient care?: No      Works in a special setting where the risk of COVID-19 transmission may be high? (this may include long-term care, correctional and snf facilities; homeless shelters; assisted-living facilities and group homes ): No      Resident in a special setting where the risk of COVID-19 transmission may be high? (this may include long-term care, correctional and snf facilities; homeless shelters; assisted-living facilities and group homes ): No      P unsure of any direct exposure had headache, fatigue, fever and headache unlike headaches she has had in past She is more tired No sinus sxs reported and no ill contacts she is aware of     Lab Results   Component Value Date    Aissatou Kang Not Detected 11/01/2020     Past Medical History:   Diagnosis Date    Hypertension      Past Surgical History:   Procedure Laterality Date    CHOLECYSTECTOMY  2005    PILONIDAL CYST DRAINAGE      incision and drainage; unknown     Current Outpatient Medications   Medication Sig Dispense Refill    lisinopril-hydrochlorothiazide (PRINZIDE,ZESTORETIC) 20-25 MG per tablet TAKE 1 TABLET TWICE A  tablet 3    Multiple Vitamin (MULTI-VITAMIN DAILY PO) Take by mouth      potassium chloride (Klor-Con M20) 20 mEq tablet Take 1 tablet (20 mEq total) by mouth daily 90 tablet 3     No current facility-administered medications for this visit  Allergies   Allergen Reactions    Amoxicillin     Latex     Other      Adhesive Tape    Sulfa Antibiotics        Review of Systems   Constitutional: Positive for fatigue and fever  HENT: Negative for rhinorrhea and sore throat  Respiratory: Negative for cough, chest tightness and shortness of breath  Neurological: Positive for headaches  Objective: There were no vitals filed for this visit  Physical Exam  Constitutional:       Appearance: She is not diaphoretic  Pulmonary:      Effort: Pulmonary effort is normal  No respiratory distress  Neurological:      Mental Status: She is alert  Psychiatric:         Mood and Affect: Mood normal          Behavior: Behavior normal          Thought Content: Thought content normal          Judgment: Judgment normal        VIRTUAL VISIT DISCLAIMER    Manan Causey acknowledges that she has consented to an online visit or consultation  She understands that the online visit is based solely on information provided by her, and that, in the absence of a face-to-face physical evaluation by the physician, the diagnosis she receives is both limited and provisional in terms of accuracy and completeness  This is not intended to replace a full medical face-to-face evaluation by the physician  Manan Causey understands and accepts these terms

## 2021-05-10 NOTE — TELEPHONE ENCOUNTER
I will do phone visit at 11:45 today  She can take Tylenol every 4-5 hours and schedule it Increase fluids  I would recommend she consider getting swab here today as well to have PCR confirmation and she might be a candidate for monoclonal antibody infusion to help sxs - need to have pcr test to evlauate and sxs onset within 7 days

## 2021-05-10 NOTE — TELEPHONE ENCOUNTER
Pt called she was tested for covid yesterday at [facility] and has been experiencing headache and fever at this point  She said it started Saturday with the headache  It was as high as 105 this am  She has been taking migraine med  Her fever broke she said but is asking what she should be taking  Please advise

## 2021-05-11 DIAGNOSIS — B34.9 VIRAL INFECTION, UNSPECIFIED: Primary | ICD-10-CM

## 2021-05-11 LAB — SARS-COV-2 RNA RESP QL NAA+PROBE: NEGATIVE

## 2021-05-11 RX ORDER — AZITHROMYCIN 250 MG/1
TABLET, FILM COATED ORAL
Qty: 6 TABLET | Refills: 0 | Status: SHIPPED | OUTPATIENT
Start: 2021-05-11 | End: 2021-05-15

## 2021-05-11 NOTE — TELEPHONE ENCOUNTER
zpack 5 day   Mucinex dm bid  Would stay home from work until sxs improve - maybe the remainder of the week

## 2021-05-11 NOTE — TELEPHONE ENCOUNTER
Pt is aware of results however she still has a fever, side of face pain next to her ear behind her ear  She's also coughing an has congestion         Amoxicillin Not Specified     Latex Not Specified     Other Not Specified  Adhesive Tape   Sulfa Antibiotics        Galilea

## 2021-05-12 ENCOUNTER — TELEPHONE (OUTPATIENT)
Dept: INTERNAL MEDICINE CLINIC | Facility: CLINIC | Age: 36
End: 2021-05-12

## 2021-05-12 ENCOUNTER — HOSPITAL ENCOUNTER (EMERGENCY)
Facility: HOSPITAL | Age: 36
Discharge: HOME | End: 2021-05-12
Attending: EMERGENCY MEDICINE | Admitting: EMERGENCY MEDICINE
Payer: COMMERCIAL

## 2021-05-12 VITALS
OXYGEN SATURATION: 97 % | RESPIRATION RATE: 18 BRPM | HEART RATE: 71 BPM | TEMPERATURE: 98.5 F | WEIGHT: 248.9 LBS | BODY MASS INDEX: 36.87 KG/M2 | DIASTOLIC BLOOD PRESSURE: 86 MMHG | SYSTOLIC BLOOD PRESSURE: 141 MMHG | HEIGHT: 69 IN

## 2021-05-12 DIAGNOSIS — E87.6 HYPOKALEMIA: ICD-10-CM

## 2021-05-12 DIAGNOSIS — R51.9 HEADACHE: Primary | ICD-10-CM

## 2021-05-12 DIAGNOSIS — G50.0 TRIGEMINAL NEURALGIA OF RIGHT SIDE OF FACE: ICD-10-CM

## 2021-05-12 LAB
ANION GAP SERPL CALCULATED.3IONS-SCNC: 6 MMOL/L (ref 4–13)
BASOPHILS # BLD MANUAL: 0 THOUSAND/UL (ref 0–0.1)
BASOPHILS NFR MAR MANUAL: 0 % (ref 0–1)
BUN SERPL-MCNC: 6 MG/DL (ref 5–25)
CALCIUM SERPL-MCNC: 9.1 MG/DL (ref 8.3–10.1)
CHLORIDE SERPL-SCNC: 94 MMOL/L (ref 100–108)
CO2 SERPL-SCNC: 37 MMOL/L (ref 21–32)
CREAT SERPL-MCNC: 0.87 MG/DL (ref 0.6–1.3)
EOSINOPHIL # BLD MANUAL: 0.04 THOUSAND/UL (ref 0–0.4)
EOSINOPHIL NFR BLD MANUAL: 1 % (ref 0–6)
ERYTHROCYTE [DISTWIDTH] IN BLOOD BY AUTOMATED COUNT: 13 % (ref 11.6–15.1)
EXT PREG TEST URINE: NEGATIVE
EXT. CONTROL ED NAV: NORMAL
GFR SERPL CREATININE-BSD FRML MDRD: 87 ML/MIN/1.73SQ M
GLUCOSE SERPL-MCNC: 101 MG/DL (ref 65–140)
HCT VFR BLD AUTO: 43.2 % (ref 34.8–46.1)
HGB BLD-MCNC: 14.3 G/DL (ref 11.5–15.4)
LYMPHOCYTES # BLD AUTO: 1.27 THOUSAND/UL (ref 0.6–4.47)
LYMPHOCYTES # BLD AUTO: 36 % (ref 14–44)
MCH RBC QN AUTO: 25.9 PG (ref 26.8–34.3)
MCHC RBC AUTO-ENTMCNC: 33.1 G/DL (ref 31.4–37.4)
MCV RBC AUTO: 78 FL (ref 82–98)
MONOCYTES # BLD AUTO: 0.21 THOUSAND/UL (ref 0–1.22)
MONOCYTES NFR BLD: 6 % (ref 4–12)
NEUTROPHILS # BLD MANUAL: 1.91 THOUSAND/UL (ref 1.85–7.62)
NEUTS BAND NFR BLD MANUAL: 3 % (ref 0–8)
NEUTS SEG NFR BLD AUTO: 51 % (ref 43–75)
NRBC BLD AUTO-RTO: 0 /100 WBCS
PLATELET # BLD AUTO: 163 THOUSANDS/UL (ref 149–390)
PLATELET BLD QL SMEAR: ADEQUATE
PMV BLD AUTO: 10.9 FL (ref 8.9–12.7)
POTASSIUM SERPL-SCNC: 2.3 MMOL/L (ref 3.5–5.3)
RBC # BLD AUTO: 5.53 MILLION/UL (ref 3.81–5.12)
RBC MORPH BLD: NORMAL
SODIUM SERPL-SCNC: 137 MMOL/L (ref 136–145)
TOTAL CELLS COUNTED SPEC: 100
VARIANT LYMPHS # BLD AUTO: 3 %
WBC # BLD AUTO: 3.53 THOUSAND/UL (ref 4.31–10.16)

## 2021-05-12 PROCEDURE — 80048 BASIC METABOLIC PNL TOTAL CA: CPT | Performed by: EMERGENCY MEDICINE

## 2021-05-12 PROCEDURE — 96375 TX/PRO/DX INJ NEW DRUG ADDON: CPT

## 2021-05-12 PROCEDURE — 96361 HYDRATE IV INFUSION ADD-ON: CPT

## 2021-05-12 PROCEDURE — 85007 BL SMEAR W/DIFF WBC COUNT: CPT | Performed by: EMERGENCY MEDICINE

## 2021-05-12 PROCEDURE — 81025 URINE PREGNANCY TEST: CPT | Performed by: EMERGENCY MEDICINE

## 2021-05-12 PROCEDURE — 99284 EMERGENCY DEPT VISIT MOD MDM: CPT | Performed by: EMERGENCY MEDICINE

## 2021-05-12 PROCEDURE — 96365 THER/PROPH/DIAG IV INF INIT: CPT

## 2021-05-12 PROCEDURE — 99283 EMERGENCY DEPT VISIT LOW MDM: CPT

## 2021-05-12 PROCEDURE — 36415 COLL VENOUS BLD VENIPUNCTURE: CPT | Performed by: EMERGENCY MEDICINE

## 2021-05-12 PROCEDURE — 85027 COMPLETE CBC AUTOMATED: CPT | Performed by: EMERGENCY MEDICINE

## 2021-05-12 RX ORDER — POTASSIUM CHLORIDE 14.9 MG/ML
20 INJECTION INTRAVENOUS ONCE
Status: COMPLETED | OUTPATIENT
Start: 2021-05-12 | End: 2021-05-12

## 2021-05-12 RX ORDER — DIPHENHYDRAMINE HYDROCHLORIDE 50 MG/ML
25 INJECTION INTRAMUSCULAR; INTRAVENOUS ONCE
Status: COMPLETED | OUTPATIENT
Start: 2021-05-12 | End: 2021-05-12

## 2021-05-12 RX ORDER — POTASSIUM CHLORIDE 20 MEQ/1
40 TABLET, EXTENDED RELEASE ORAL ONCE
Status: COMPLETED | OUTPATIENT
Start: 2021-05-12 | End: 2021-05-12

## 2021-05-12 RX ORDER — CARBAMAZEPINE 200 MG/1
200 TABLET ORAL ONCE
Status: COMPLETED | OUTPATIENT
Start: 2021-05-12 | End: 2021-05-12

## 2021-05-12 RX ORDER — METOCLOPRAMIDE HYDROCHLORIDE 5 MG/ML
10 INJECTION INTRAMUSCULAR; INTRAVENOUS ONCE
Status: COMPLETED | OUTPATIENT
Start: 2021-05-12 | End: 2021-05-12

## 2021-05-12 RX ORDER — CARBAMAZEPINE 200 MG/1
200 TABLET ORAL 3 TIMES DAILY
Qty: 90 TABLET | Refills: 0 | Status: SHIPPED | OUTPATIENT
Start: 2021-05-12

## 2021-05-12 RX ORDER — KETOROLAC TROMETHAMINE 30 MG/ML
15 INJECTION, SOLUTION INTRAMUSCULAR; INTRAVENOUS ONCE
Status: COMPLETED | OUTPATIENT
Start: 2021-05-12 | End: 2021-05-12

## 2021-05-12 RX ADMIN — POTASSIUM CHLORIDE 40 MEQ: 1500 TABLET, EXTENDED RELEASE ORAL at 13:49

## 2021-05-12 RX ADMIN — KETOROLAC TROMETHAMINE 15 MG: 30 INJECTION, SOLUTION INTRAMUSCULAR at 13:15

## 2021-05-12 RX ADMIN — DIPHENHYDRAMINE HYDROCHLORIDE 25 MG: 50 INJECTION, SOLUTION INTRAMUSCULAR; INTRAVENOUS at 13:15

## 2021-05-12 RX ADMIN — CARBAMAZEPINE 200 MG: 200 TABLET ORAL at 13:49

## 2021-05-12 RX ADMIN — POTASSIUM CHLORIDE 20 MEQ: 14.9 INJECTION, SOLUTION INTRAVENOUS at 13:50

## 2021-05-12 RX ADMIN — METOCLOPRAMIDE HYDROCHLORIDE 10 MG: 5 INJECTION INTRAMUSCULAR; INTRAVENOUS at 13:15

## 2021-05-12 RX ADMIN — SODIUM CHLORIDE 1000 ML: 0.9 INJECTION, SOLUTION INTRAVENOUS at 13:13

## 2021-05-12 NOTE — TELEPHONE ENCOUNTER
Patient with right sided headache, can't get out of bed  Both covid swabs were negative  Unable to use her phone to call due to headache  Dry heaves at times  Mother thinks trigeminal neuralgia possibly  Asking if you want to see her or ER? Patient taking zpack without relief, extra strength tylenol as well and no relief

## 2021-05-12 NOTE — ED NOTES
Potassium was increased to 100mL/hr per Dr Michaela Matthews  Patient reports that she is feeling much better        Lj Fredy Gonzalez, TONYA  05/12/21 5055

## 2021-05-12 NOTE — ED NOTES
Lights dimmed for patient comfort  Visitor at bedside  Call bell remains within reach        Luis Eduardo Gonzalez, RN  05/12/21 3589

## 2021-05-12 NOTE — ED PROVIDER NOTES
History  Chief Complaint   Patient presents with    Headache     pt states she has had a headache with fever for 5 days, has taken tylenol extra strength every 6 hours with some relief     Patient is a 29-year-old female presenting with headache  Patient states for 3 days now she has had symptomatic headache  She was seen on a tele health visit with her primary care physician and started on Zithromax yesterday for presumed sinusitis  She also does complain of sharp stabbing episodes of pain behind her right ear and around her right temple  Subjective fevers at home for the last several days  Has been treating with Tylenol at home with relief  No focal motor sensory neurological deficits  No chest pain, shortness of breath, cough, abdominal pain  Has had episodes of nausea  No diaphoresis  Patient not immunocompromised  Has had history of migraines but states this feels more intense and is lasting longer than previous migraines  The sharp stabbing pain behind the right ear and around the right temple is new  Mother concern for trigeminal neuralgia as the mother had this condition  Patient not anticoagulated  No meningismus  No recent travel injury or illness  Headache  Associated symptoms: nausea and sinus pressure    Associated symptoms: no abdominal pain, no back pain, no congestion, no cough, no diarrhea, no dizziness, no ear pain, no eye pain, no fatigue, no fever, no neck pain, no seizures, no sore throat and no vomiting        Prior to Admission Medications   Prescriptions Last Dose Informant Patient Reported? Taking?    Multiple Vitamin (MULTI-VITAMIN DAILY PO) Past Week at Unknown time  Yes Yes   Sig: Take by mouth   azithromycin (ZITHROMAX) 250 mg tablet 5/11/2021 at Unknown time  No Yes   Sig: Take 2 tablets today then 1 tablet daily x 4 days   lisinopril-hydrochlorothiazide (PRINZIDE,ZESTORETIC) 20-25 MG per tablet 5/12/2021 at Unknown time  No Yes   Sig: TAKE 1 TABLET TWICE A DAY potassium chloride (Klor-Con M20) 20 mEq tablet Past Week at Unknown time  No Yes   Sig: Take 1 tablet (20 mEq total) by mouth daily      Facility-Administered Medications: None       Past Medical History:   Diagnosis Date    Hypertension        Past Surgical History:   Procedure Laterality Date    CHOLECYSTECTOMY  2005    PILONIDAL CYST DRAINAGE      incision and drainage; unknown       Family History   Problem Relation Age of Onset    Hypertension Father     Coronary artery disease Maternal Grandmother     Alzheimer's disease Paternal Grandmother     Melanoma Paternal Grandfather     No Known Problems Mother     Melanoma Maternal Grandfather      I have reviewed and agree with the history as documented  E-Cigarette/Vaping    E-Cigarette Use Never User      E-Cigarette/Vaping Substances    Nicotine No     THC No     CBD No     Flavoring No     Other No     Unknown No      Social History     Tobacco Use    Smoking status: Never Smoker    Smokeless tobacco: Never Used   Substance Use Topics    Alcohol use: No    Drug use: No       Review of Systems   Constitutional: Negative for appetite change, chills, fatigue, fever and unexpected weight change  HENT: Positive for sinus pressure and sinus pain  Negative for congestion, ear pain, rhinorrhea and sore throat  Eyes: Negative for pain and visual disturbance  Respiratory: Negative for cough, chest tightness, shortness of breath and wheezing  Cardiovascular: Negative for chest pain, palpitations and leg swelling  Gastrointestinal: Positive for nausea  Negative for abdominal pain, constipation, diarrhea and vomiting  Genitourinary: Negative for difficulty urinating, dysuria, frequency, hematuria, menstrual problem, pelvic pain, vaginal bleeding and vaginal discharge  Musculoskeletal: Negative for arthralgias, back pain and neck pain  Skin: Negative for color change and rash  Neurological: Positive for headaches   Negative for dizziness, seizures, syncope and light-headedness  Psychiatric/Behavioral: Negative for behavioral problems and sleep disturbance  All other systems reviewed and are negative  Physical Exam  Physical Exam  Vitals signs and nursing note reviewed  Constitutional:       General: She is not in acute distress  Appearance: Normal appearance  She is well-developed and normal weight  She is not ill-appearing, toxic-appearing or diaphoretic  HENT:      Head: Normocephalic and atraumatic  Right Ear: Tympanic membrane, ear canal and external ear normal  There is no impacted cerumen  Left Ear: Tympanic membrane, ear canal and external ear normal  There is no impacted cerumen  Nose: Nose normal  No rhinorrhea  Mouth/Throat:      Mouth: Mucous membranes are moist    Eyes:      General: No scleral icterus  Extraocular Movements: Extraocular movements intact  Conjunctiva/sclera: Conjunctivae normal    Neck:      Musculoskeletal: Normal range of motion and neck supple  Cardiovascular:      Rate and Rhythm: Normal rate and regular rhythm  Pulses: Normal pulses  Heart sounds: Normal heart sounds  No murmur  No friction rub  No gallop  Pulmonary:      Effort: Pulmonary effort is normal  No respiratory distress  Breath sounds: Normal breath sounds  No wheezing or rales  Abdominal:      General: There is no distension  Palpations: Abdomen is soft  There is no mass  Tenderness: There is no abdominal tenderness  There is no right CVA tenderness, left CVA tenderness, guarding or rebound  Hernia: No hernia is present  Musculoskeletal: Normal range of motion  General: No swelling, tenderness, deformity or signs of injury  Right lower leg: No edema  Left lower leg: No edema  Skin:     General: Skin is warm and dry  Capillary Refill: Capillary refill takes less than 2 seconds  Coloration: Skin is not jaundiced or pale  Findings: No bruising, erythema, lesion or rash  Neurological:      General: No focal deficit present  Mental Status: She is alert and oriented to person, place, and time     Psychiatric:         Mood and Affect: Mood normal          Behavior: Behavior normal          Vital Signs  ED Triage Vitals [05/12/21 1244]   Temperature Pulse Respirations Blood Pressure SpO2   98 5 °F (36 9 °C) 83 18 138/88 96 %      Temp Source Heart Rate Source Patient Position - Orthostatic VS BP Location FiO2 (%)   Temporal Monitor Lying Left arm --      Pain Score       4           Vitals:    05/12/21 1300 05/12/21 1330 05/12/21 1400 05/12/21 1430   BP: 121/90 126/80 129/83 130/78   Pulse: 74 75 68 67   Patient Position - Orthostatic VS: Lying Lying Lying Lying         Visual Acuity  Visual Acuity      Most Recent Value   L Pupil Size (mm)  2   R Pupil Size (mm)  2          ED Medications  Medications   sodium chloride 0 9 % bolus 1,000 mL (0 mL Intravenous Stopped 5/12/21 1509)   ketorolac (TORADOL) injection 15 mg (15 mg Intravenous Given 5/12/21 1315)   metoclopramide (REGLAN) injection 10 mg (10 mg Intravenous Given 5/12/21 1315)   diphenhydrAMINE (BENADRYL) injection 25 mg (25 mg Intravenous Given 5/12/21 1315)   potassium chloride (K-DUR,KLOR-CON) CR tablet 40 mEq (40 mEq Oral Given 5/12/21 1349)   potassium chloride 20 mEq IVPB (premix) (0 mEq Intravenous Stopped 5/12/21 1509)   carBAMazepine (TEGretol) tablet 200 mg (200 mg Oral Given 5/12/21 1349)       Diagnostic Studies  Results Reviewed     Procedure Component Value Units Date/Time    Basic metabolic panel [[id number]]  (Abnormal) Collected: 05/12/21 1304    Lab Status: Final result Specimen: Blood from Arm, Right Updated: 05/12/21 1329     Sodium 137 mmol/L      Potassium 2 3 mmol/L      Chloride 94 mmol/L      CO2 37 mmol/L      ANION GAP 6 mmol/L      BUN 6 mg/dL      Creatinine 0 87 mg/dL      Glucose 101 mg/dL      Calcium 9 1 mg/dL      eGFR 87 ml/min/1 73sq m Narrative:      National Kidney Disease Foundation guidelines for Chronic Kidney Disease (CKD):     Stage 1 with normal or high GFR (GFR > 90 mL/min/1 73 square meters)    Stage 2 Mild CKD (GFR = 60-89 mL/min/1 73 square meters)    Stage 3A Moderate CKD (GFR = 45-59 mL/min/1 73 square meters)    Stage 3B Moderate CKD (GFR = 30-44 mL/min/1 73 square meters)    Stage 4 Severe CKD (GFR = 15-29 mL/min/1 73 square meters)    Stage 5 End Stage CKD (GFR <15 mL/min/1 73 square meters)  Note: GFR calculation is accurate only with a steady state creatinine    Manual Differential(PHLEBS Do Not Order) [[id number]]  (Abnormal) Collected: 05/12/21 1304    Lab Status: Final result Specimen: Blood from Arm, Right Updated: 05/12/21 1323     Segmented % 51 %      Bands % 3 %      Lymphocytes % 36 %      Monocytes % 6 %      Eosinophils, % 1 %      Basophils % 0 %      Atypical Lymphocytes % 3 %      Absolute Neutrophils 1 91 Thousand/uL      Lymphocytes Absolute 1 27 Thousand/uL      Monocytes Absolute 0 21 Thousand/uL      Eosinophils Absolute 0 04 Thousand/uL      Basophils Absolute 0 00 Thousand/uL      Total Counted 100     RBC Morphology Normal     Platelet Estimate Adequate    CBC and differential [[id number]]  (Abnormal) Collected: 05/12/21 1304    Lab Status: Final result Specimen: Blood from Arm, Right Updated: 05/12/21 1323     WBC 3 53 Thousand/uL      RBC 5 53 Million/uL      Hemoglobin 14 3 g/dL      Hematocrit 43 2 %      MCV 78 fL      MCH 25 9 pg      MCHC 33 1 g/dL      RDW 13 0 %      MPV 10 9 fL      Platelets 450 Thousands/uL      nRBC 0 /100 WBCs     Narrative: This is an appended report  These results have been appended to a previously verified report      POCT pregnancy, urine [[id number]]  (Normal) Resulted: 05/12/21 1315    Lab Status: Final result Updated: 05/12/21 1315     EXT PREG TEST UR (Ref: Negative) negative     Control valid                 No orders to display Procedures  Procedures         ED Course                             SBIRT 20yo+      Most Recent Value   SBIRT (22 yo +)   In order to provide better care to our patients, we are screening all of our patients for alcohol and drug use  Would it be okay to ask you these screening questions? Yes Filed at: 05/12/2021 1250   Initial Alcohol Screen: US AUDIT-C    1  How often do you have a drink containing alcohol?  0 Filed at: 05/12/2021 1250   2  How many drinks containing alcohol do you have on a typical day you are drinking? 0 Filed at: 05/12/2021 1250   3a  Male UNDER 65: How often do you have five or more drinks on one occasion? 0 Filed at: 05/12/2021 1250   3b  FEMALE Any Age, or MALE 65+: How often do you have 4 or more drinks on one occassion? 0 Filed at: 05/12/2021 1250   Audit-C Score  0 Filed at: 05/12/2021 1250   CONNER: How many times in the past year have you    Used an illegal drug or used a prescription medication for non-medical reasons? Never Filed at: 05/12/2021 1250                    MDM    Disposition  Final diagnoses:   Headache   Trigeminal neuralgia of right side of face   Hypokalemia     Time reflects when diagnosis was documented in both MDM as applicable and the Disposition within this note     Time User Action Codes Description Comment    5/12/2021  2:15 PM Sofi RAMOS Add [R51 9] Headache     5/12/2021  2:16 PM Lazaro Greer Add [G50 0] Trigeminal neuralgia of right side of face     5/12/2021  2:16 PM Sofi RAMOS Add [E87 6] Hypokalemia       ED Disposition     ED Disposition Condition Date/Time Comment    Discharge Stable Wed May 12, 2021  2:15 PM Rizwan Robert discharge to home/self care              Follow-up Information     Follow up With Specialties Details Why [facility], DO Internal Medicine Schedule an appointment as soon as possible for a visit   [address]  [address]  [phone]            Patient's Medications   Discharge Prescriptions    CARBAMAZEPINE (TEGRETOL) 200 MG TABLET    Take 1 tablet (200 mg total) by mouth 3 (three) times a day       Start Date: 5/12/2021 End Date: --       Order Dose: 200 mg       Quantity: 90 tablet    Refills: 0     No discharge procedures on file      PDMP Review     None          ED Provider  Electronically Signed by           Deirdre Staton DO  05/12/21 [address] [facility],   05/12/21 2229

## 2021-05-13 ENCOUNTER — TELEPHONE (OUTPATIENT)
Dept: INTERNAL MEDICINE CLINIC | Facility: CLINIC | Age: 36
End: 2021-05-13

## 2021-05-13 NOTE — TELEPHONE ENCOUNTER
Spoke with patient, states she hasn't been taking her K+ and will resume daily  She was scheduled for ER f/up

## 2021-05-13 NOTE — TELEPHONE ENCOUNTER
----- Message from Herman Anthony DO sent at 5/13/2021  9:29 AM EDT -----  Regarding: FW: Test Results Question  Contact: [phone]  Would recommend she take kdur 20 meq daily although she had oral and IV supplement in ER but her level was quite low  She will need appt tomorrow or Saturday for followup   ----- Message -----  From: Luis Bond  Sent: 5/13/2021   9:02 AM EDT  To: [facility]  Subject: Test Results Question                            I went to the ER yesterday and was told to follow up with your office  I saw all of the labs were updated in my chart, and I wanted to check if there are any other meds I should be taking just continue with the z pack and the newly prescribed meds from the er for neuralgia? Thanks!   [address] [facility]

## 2021-05-13 NOTE — TELEPHONE ENCOUNTER
----- Message from Trudi Schrader DO sent at 5/13/2021  9:29 AM EDT -----  Regarding: FW: Test Results Question  Contact: [phone]  Would recommend she take kdur 20 meq daily although she had oral and IV supplement in ER but her level was quite low  She will need appt tomorrow or Saturday for followup   ----- Message -----  From: Scarlett Godinez  Sent: 5/13/2021   9:02 AM EDT  To: [facility]  Subject: Test Results Question                            I went to the ER yesterday and was told to follow up with your office  I saw all of the labs were updated in my chart, and I wanted to check if there are any other meds I should be taking just continue with the z pack and the newly prescribed meds from the er for neuralgia? Thanks!   Ganga Martin

## 2021-05-13 NOTE — TELEPHONE ENCOUNTER
----- Message from Mi Wallace DO sent at 5/13/2021  9:29 AM EDT -----  Regarding: FW: Test Results Question  Contact: [phone]  Would recommend she take kdur 20 meq daily although she had oral and IV supplement in ER but her level was quite low  She will need appt tomorrow or Saturday for followup   ----- Message -----  From: Manan Screen  Sent: 5/13/2021   9:02 AM EDT  To: [facility]  Subject: Test Results Question                            I went to the ER yesterday and was told to follow up with your office  I saw all of the labs were updated in my chart, and I wanted to check if there are any other meds I should be taking just continue with the z pack and the newly prescribed meds from the er for neuralgia? Thanks!   Dorothy Ventura

## 2021-05-13 NOTE — TELEPHONE ENCOUNTER
LMOM for pt to call for a follow up  / Also it looks as though Pt is on Potassium already as it is on her active med list

## 2021-05-14 ENCOUNTER — OFFICE VISIT (OUTPATIENT)
Dept: INTERNAL MEDICINE CLINIC | Facility: CLINIC | Age: 36
End: 2021-05-14
Payer: COMMERCIAL

## 2021-05-14 VITALS
BODY MASS INDEX: 36.16 KG/M2 | DIASTOLIC BLOOD PRESSURE: 78 MMHG | SYSTOLIC BLOOD PRESSURE: 126 MMHG | HEIGHT: 69 IN | TEMPERATURE: 98 F | WEIGHT: 244.13 LBS

## 2021-05-14 DIAGNOSIS — H70.001 MASTOIDITIS, ACUTE, RIGHT: ICD-10-CM

## 2021-05-14 DIAGNOSIS — E87.6 HYPOKALEMIA: ICD-10-CM

## 2021-05-14 DIAGNOSIS — M79.2 NEURALGIA: Primary | ICD-10-CM

## 2021-05-14 PROCEDURE — 3725F SCREEN DEPRESSION PERFORMED: CPT | Performed by: INTERNAL MEDICINE

## 2021-05-14 PROCEDURE — 99214 OFFICE O/P EST MOD 30 MIN: CPT | Performed by: INTERNAL MEDICINE

## 2021-05-14 NOTE — PROGRESS NOTES
Assessment/Plan:         Diagnoses and all orders for this visit:    Neuralgia  -     CT sinus wo contrast; Future    Mastoiditis, acute, right  -     CT sinus wo contrast; Future    Other orders  -     Cancel: Basic metabolic panel; Future           Patient ID: Naima Andrade is a 29 y o  female  HPI        Review of Systems   Constitutional: Negative for activity change, appetite change, diaphoresis and fever  HENT: Positive for congestion, facial swelling, rhinorrhea, sinus pressure and sinus pain  Negative for hearing loss and trouble swallowing  Eyes: Negative for visual disturbance  Respiratory: Negative for cough and shortness of breath  Cardiovascular: Negative for chest pain, palpitations and leg swelling  Gastrointestinal: Negative for abdominal distention and abdominal pain  Genitourinary: Negative for difficulty urinating, flank pain and frequency  Musculoskeletal: Positive for myalgias and neck pain  Neurological: Positive for light-headedness and headaches  Negative for dizziness  Psychiatric/Behavioral: Positive for sleep disturbance  The patient is not nervous/anxious          Past Medical History:   Diagnosis Date    Hypertension      Past Surgical History:   Procedure Laterality Date    CHOLECYSTECTOMY  2005    PILONIDAL CYST DRAINAGE      incision and drainage; unknown     Social History     Socioeconomic History    Marital status: Single     Spouse name: Not on file    Number of children: Not on file    Years of education: Not on file    Highest education level: Not on file   Occupational History    Not on file   Social Needs    Financial resource strain: Not on file    Food insecurity     Worry: Not on file     Inability: Not on file    Transportation needs     Medical: Not on file     Non-medical: Not on file   Tobacco Use    Smoking status: Never Smoker    Smokeless tobacco: Never Used   Substance and Sexual Activity    Alcohol use: No    Drug use: No    Sexual activity: Never   Lifestyle    Physical activity     Days per week: Not on file     Minutes per session: Not on file    Stress: Not on file   Relationships    Social connections     Talks on phone: Not on file     Gets together: Not on file     Attends [employer] service: Not on file     Active member of club or organization: Not on file     Attends meetings of clubs or organizations: Not on file     Relationship status: Not on file    Intimate partner violence     Fear of current or ex partner: Not on file     Emotionally abused: Not on file     Physically abused: Not on file     Forced sexual activity: Not on file   Other Topics Concern    Not on file   Social History Narrative    Caffeine use    Dental care, regularly    Good dental hygiene    Denominational    Sun protection sunscreen    Uses safety equipment-seatbelts     Allergies   Allergen Reactions    Amoxicillin     Latex     Other      Adhesive Tape    Sulfa Antibiotics      BMI Counseling: Body mass index is 36 05 kg/m²  The BMI is above normal  Nutrition recommendations include consuming healthier snacks and moderation in carbohydrate intake  Exercise recommendations include exercising 3-5 times per week  No pharmacotherapy was ordered  /78   Temp 98 °F (36 7 °C) (Temporal)   Ht 5' 9" (1 753 m)   Wt 111 kg (244 lb 2 oz)   BMI 36 05 kg/m²          Physical Exam  Vitals signs reviewed  Constitutional:       General: She is not in acute distress  Appearance: Normal appearance  She is not ill-appearing, toxic-appearing or diaphoretic  HENT:      Head: Normocephalic and atraumatic  Right Ear: Tympanic membrane, ear canal and external ear normal  There is no impacted cerumen  Left Ear: Tympanic membrane, ear canal and external ear normal  There is no impacted cerumen  Nose: Nose normal       Mouth/Throat:      Mouth: Mucous membranes are dry  Eyes:      General: No scleral icterus       Extraocular Movements: Extraocular movements intact  Conjunctiva/sclera: Conjunctivae normal       Pupils: Pupils are equal, round, and reactive to light  Neck:      Musculoskeletal: Normal range of motion and neck supple  No neck rigidity  Cardiovascular:      Rate and Rhythm: Normal rate and regular rhythm  Pulses: Normal pulses  Heart sounds: Normal heart sounds  No murmur  Pulmonary:      Effort: Pulmonary effort is normal  No respiratory distress  Breath sounds: Normal breath sounds  No wheezing  Abdominal:      General: Bowel sounds are normal  There is no distension  Palpations: Abdomen is soft  Tenderness: There is no abdominal tenderness  Musculoskeletal: Normal range of motion  General: No swelling or tenderness  Lymphadenopathy:      Cervical: No cervical adenopathy  Skin:     General: Skin is dry  Neurological:      General: No focal deficit present  Mental Status: She is alert and oriented to person, place, and time  Mental status is at baseline  Cranial Nerves: No cranial nerve deficit  Sensory: Sensory deficit present  Motor: Weakness present  Psychiatric:         Mood and Affect: Mood normal          Behavior: Behavior normal          Thought Content:  Thought content normal          Judgment: Judgment normal

## 2021-05-19 ENCOUNTER — TELEPHONE (OUTPATIENT)
Dept: INTERNAL MEDICINE CLINIC | Facility: CLINIC | Age: 36
End: 2021-05-19

## 2021-05-19 ENCOUNTER — APPOINTMENT (OUTPATIENT)
Dept: RADIOLOGY | Facility: MEDICAL CENTER | Age: 36
End: 2021-05-19
Payer: COMMERCIAL

## 2021-05-19 DIAGNOSIS — H70.001 MASTOIDITIS, ACUTE, RIGHT: ICD-10-CM

## 2021-05-19 DIAGNOSIS — H70.001 MASTOIDITIS, ACUTE, RIGHT: Primary | ICD-10-CM

## 2021-05-19 PROCEDURE — 70220 X-RAY EXAM OF SINUSES: CPT

## 2021-05-24 ENCOUNTER — APPOINTMENT (OUTPATIENT)
Dept: LAB | Facility: MEDICAL CENTER | Age: 36
End: 2021-05-24
Payer: COMMERCIAL

## 2021-05-24 DIAGNOSIS — E78.5 HYPERLIPIDEMIA, UNSPECIFIED HYPERLIPIDEMIA TYPE: ICD-10-CM

## 2021-05-24 DIAGNOSIS — I10 BENIGN ESSENTIAL HYPERTENSION: ICD-10-CM

## 2021-05-24 LAB
ALBUMIN SERPL BCP-MCNC: 3.7 G/DL (ref 3.5–5)
ALP SERPL-CCNC: 98 U/L (ref 46–116)
ALT SERPL W P-5'-P-CCNC: 181 U/L (ref 12–78)
ANION GAP SERPL CALCULATED.3IONS-SCNC: 3 MMOL/L (ref 4–13)
AST SERPL W P-5'-P-CCNC: 38 U/L (ref 5–45)
BILIRUB SERPL-MCNC: 0.34 MG/DL (ref 0.2–1)
BUN SERPL-MCNC: 14 MG/DL (ref 5–25)
CALCIUM SERPL-MCNC: 9.1 MG/DL (ref 8.3–10.1)
CHLORIDE SERPL-SCNC: 104 MMOL/L (ref 100–108)
CHOLEST SERPL-MCNC: 219 MG/DL (ref 50–200)
CO2 SERPL-SCNC: 32 MMOL/L (ref 21–32)
CREAT SERPL-MCNC: 0.68 MG/DL (ref 0.6–1.3)
GFR SERPL CREATININE-BSD FRML MDRD: 114 ML/MIN/1.73SQ M
GLUCOSE P FAST SERPL-MCNC: 81 MG/DL (ref 65–99)
HDLC SERPL-MCNC: 68 MG/DL
LDLC SERPL CALC-MCNC: 135 MG/DL (ref 0–100)
NONHDLC SERPL-MCNC: 151 MG/DL
POTASSIUM SERPL-SCNC: 3.6 MMOL/L (ref 3.5–5.3)
PROT SERPL-MCNC: 7.8 G/DL (ref 6.4–8.2)
SODIUM SERPL-SCNC: 139 MMOL/L (ref 136–145)
TRIGL SERPL-MCNC: 78 MG/DL

## 2021-05-24 PROCEDURE — 80061 LIPID PANEL: CPT

## 2021-05-24 PROCEDURE — 36415 COLL VENOUS BLD VENIPUNCTURE: CPT

## 2021-05-24 PROCEDURE — 80053 COMPREHEN METABOLIC PANEL: CPT

## 2021-05-25 ENCOUNTER — TELEPHONE (OUTPATIENT)
Dept: INTERNAL MEDICINE CLINIC | Facility: CLINIC | Age: 36
End: 2021-05-25

## 2021-05-25 DIAGNOSIS — R79.89 ELEVATED LIVER FUNCTION TESTS: Primary | ICD-10-CM

## 2021-05-25 NOTE — TELEPHONE ENCOUNTER
----- Message from Ceci Degroot DO sent at 5/25/2021  6:12 AM EDT -----  Potassium normal stay on present rx  One liver function test is mildly elevated - would recheck lfts before June appt and also order monospot

## 2021-05-25 NOTE — TELEPHONE ENCOUNTER
Patient asking if she can wean down the TID dosing of the tegretol, states she is feeling better and the medication is making her really tired?

## 2021-06-09 ENCOUNTER — APPOINTMENT (OUTPATIENT)
Dept: LAB | Facility: MEDICAL CENTER | Age: 36
End: 2021-06-09
Payer: COMMERCIAL

## 2021-06-09 DIAGNOSIS — R79.89 ELEVATED LIVER FUNCTION TESTS: ICD-10-CM

## 2021-06-09 LAB
ALBUMIN SERPL BCP-MCNC: 4 G/DL (ref 3.5–5)
ALP SERPL-CCNC: 80 U/L (ref 46–116)
ALT SERPL W P-5'-P-CCNC: 40 U/L (ref 12–78)
AST SERPL W P-5'-P-CCNC: 24 U/L (ref 5–45)
BILIRUB DIRECT SERPL-MCNC: 0.13 MG/DL (ref 0–0.2)
BILIRUB SERPL-MCNC: 0.44 MG/DL (ref 0.2–1)
PROT SERPL-MCNC: 7.8 G/DL (ref 6.4–8.2)

## 2021-06-09 PROCEDURE — 80076 HEPATIC FUNCTION PANEL: CPT

## 2021-06-09 PROCEDURE — 36415 COLL VENOUS BLD VENIPUNCTURE: CPT

## 2021-06-09 PROCEDURE — 86308 HETEROPHILE ANTIBODY SCREEN: CPT

## 2021-06-10 LAB — HETEROPH AB SER QL: NEGATIVE

## 2021-06-11 ENCOUNTER — OFFICE VISIT (OUTPATIENT)
Dept: INTERNAL MEDICINE CLINIC | Facility: CLINIC | Age: 36
End: 2021-06-11
Payer: COMMERCIAL

## 2021-06-11 VITALS
HEIGHT: 69 IN | DIASTOLIC BLOOD PRESSURE: 70 MMHG | WEIGHT: 252.38 LBS | TEMPERATURE: 97.8 F | SYSTOLIC BLOOD PRESSURE: 128 MMHG | BODY MASS INDEX: 37.38 KG/M2

## 2021-06-11 DIAGNOSIS — Z00.00 ANNUAL PHYSICAL EXAM: Primary | ICD-10-CM

## 2021-06-11 PROCEDURE — 99395 PREV VISIT EST AGE 18-39: CPT | Performed by: INTERNAL MEDICINE

## 2021-06-11 PROCEDURE — 1036F TOBACCO NON-USER: CPT | Performed by: INTERNAL MEDICINE

## 2021-06-11 PROCEDURE — 3008F BODY MASS INDEX DOCD: CPT | Performed by: INTERNAL MEDICINE

## 2021-06-11 NOTE — PROGRESS NOTES
ADULT ANNUAL [address],  [address] INTERNAL MEDICINE AT [facility]    NAME: Ashley Singh  AGE: 29 y o  SEX: female  : 1985     DATE: 2021     Assessment and Plan:     Problem List Items Addressed This Visit        Other    Annual physical exam - Primary      Labs stable and lfts normal   Increase fluids   She is tapering off seizure medication  Exercise, low fat diet  Gyn utd   Rto 6months    Immunizations and preventive care screenings were discussed with patient today  Appropriate education was printed on patient's after visit summary  Counseling:  · Exercise: the importance of regular exercise/physical activity was discussed  Recommend exercise 3-5 times per week for at least 30 minutes  No follow-ups on file  Chief Complaint:     Chief Complaint   Patient presents with    Annual Exam      History of Present Illness:     Adult Annual Physical   Patient here for a comprehensive physical exam  The patient reports no problems  Diet and Physical Activity  · Diet/Nutrition: limited junk food  · Exercise: 3-4 times a week on average  Depression Screening  PHQ-9 Depression Screening    PHQ-9:   Frequency of the following problems over the past two weeks:           General Health  · Sleep: gets 7-8 hours of sleep on average  · Hearing: normal - bilateral   · Vision: no vision problems  · Dental: regular dental visits  /GYN Health  · Last menstrual period:   · Contraceptive method: barrier methods  · History of STDs?: no      Review of Systems:     Review of Systems   Constitutional: Negative for activity change, chills and fever  HENT: Negative  Eyes: Negative for visual disturbance  Respiratory: Negative for cough, chest tightness and shortness of breath  Cardiovascular: Negative for chest pain, palpitations and leg swelling  Gastrointestinal: Negative for abdominal distention and abdominal pain     Genitourinary: Negative for difficulty urinating, dysuria, flank pain and frequency  Musculoskeletal: Negative for arthralgias and back pain  Neurological: Negative for dizziness, light-headedness and numbness  Psychiatric/Behavioral: Negative for sleep disturbance  The patient is not nervous/anxious         Past Medical History:     Past Medical History:   Diagnosis Date    Hypertension     Skin cancer (melanoma) ([facility] [address] )       Past Surgical History:     Past Surgical History:   Procedure Laterality Date    CHOLECYSTECTOMY  2005    PILONIDAL CYST DRAINAGE      incision and drainage; unknown    SKIN SURGERY        Social History:     E-Cigarette/Vaping    E-Cigarette Use Never User      E-Cigarette/Vaping Substances    Nicotine No     THC No     CBD No     Flavoring No     Other No     Unknown No      Social History     Socioeconomic History    Marital status: Single     Spouse name: None    Number of children: None    Years of education: None    Highest education level: None   Occupational History    None   Social Needs    Financial resource strain: None    Food insecurity     Worry: None     Inability: None    Transportation needs     Medical: None     Non-medical: None   Tobacco Use    Smoking status: Never Smoker    Smokeless tobacco: Never Used   Substance and Sexual Activity    Alcohol use: No    Drug use: No    Sexual activity: Never   Lifestyle    Physical activity     Days per week: None     Minutes per session: None    Stress: None   Relationships    Social connections     Talks on phone: None     Gets together: None     Attends Scientologist service: None     Active member of club or organization: None     Attends meetings of clubs or organizations: None     Relationship status: None    Intimate partner violence     Fear of current or ex partner: None     Emotionally abused: None     Physically abused: None     Forced sexual activity: None   Other Topics Concern    None   Social History Narrative Caffeine use    Dental care, regularly    Good dental hygiene    Zoroastrian    Sun protection sunscreen    Uses safety equipment-seatbelts      Family History:     Family History   Problem Relation Age of Onset    Hypertension Father     Coronary artery disease Maternal Grandmother     Alzheimer's disease Paternal Grandmother     Melanoma Paternal Grandfather     No Known Problems Mother     Melanoma Maternal Grandfather       Current Medications:     Current Outpatient Medications   Medication Sig Dispense Refill    carBAMazepine (TEGretol) 200 mg tablet Take 1 tablet (200 mg total) by mouth 3 (three) times a day 90 tablet 0    lisinopril-hydrochlorothiazide (PRINZIDE,ZESTORETIC) 20-25 MG per tablet TAKE 1 TABLET TWICE A  tablet 3    Multiple Vitamin (MULTI-VITAMIN DAILY PO) Take by mouth      potassium chloride (Klor-Con M20) 20 mEq tablet Take 1 tablet (20 mEq total) by mouth daily 90 tablet 3     No current facility-administered medications for this visit  Allergies: Allergies   Allergen Reactions    Amoxicillin     Latex     Other      Adhesive Tape    Sulfa Antibiotics       Physical Exam:     /70   Temp 97 8 °F (36 6 °C) (Temporal)   Ht 5' 9" (1 753 m)   Wt 114 kg (252 lb 6 oz)   BMI 37 27 kg/m²     Physical Exam  Vitals signs reviewed  Constitutional:       General: She is not in acute distress  Appearance: Normal appearance  She is not ill-appearing, toxic-appearing or diaphoretic  HENT:      Head: Normocephalic and atraumatic  Right Ear: Tympanic membrane, ear canal and external ear normal  There is no impacted cerumen  Left Ear: Tympanic membrane, ear canal and external ear normal  There is no impacted cerumen  Nose: Nose normal       Mouth/Throat:      Mouth: Mucous membranes are dry  Eyes:      General: No scleral icterus  Extraocular Movements: Extraocular movements intact        Conjunctiva/sclera: Conjunctivae normal       Pupils: Pupils are equal, round, and reactive to light  Neck:      Musculoskeletal: Normal range of motion and neck supple  No neck rigidity  Cardiovascular:      Rate and Rhythm: Normal rate and regular rhythm  Pulses: Normal pulses  Heart sounds: Normal heart sounds  No murmur  Pulmonary:      Effort: Pulmonary effort is normal  No respiratory distress  Breath sounds: Normal breath sounds  No wheezing  Abdominal:      General: Bowel sounds are normal  There is no distension  Palpations: Abdomen is soft  Tenderness: There is no abdominal tenderness  Musculoskeletal: Normal range of motion  General: No swelling or tenderness  Lymphadenopathy:      Cervical: No cervical adenopathy  Skin:     General: Skin is dry  Coloration: Skin is not jaundiced or pale  Neurological:      General: No focal deficit present  Mental Status: She is alert and oriented to person, place, and time  Mental status is at baseline  Cranial Nerves: No cranial nerve deficit  Sensory: No sensory deficit  Psychiatric:         Mood and Affect: Mood normal          Behavior: Behavior normal          Thought Content:  Thought content normal          Judgment: Judgment normal           Randolph Pena DO   [facility] INTERNAL MEDICINE AT [facility]

## 2021-06-11 NOTE — PATIENT INSTRUCTIONS

## 2021-07-15 DIAGNOSIS — L03.039 PARONYCHIA OF GREAT TOE: Primary | ICD-10-CM

## 2021-07-15 RX ORDER — CLINDAMYCIN HYDROCHLORIDE 300 MG/1
300 CAPSULE ORAL 3 TIMES DAILY
Qty: 21 CAPSULE | Refills: 0 | Status: SHIPPED | OUTPATIENT
Start: 2021-07-15 | End: 2021-07-22

## 2021-10-28 ENCOUNTER — TELEPHONE (OUTPATIENT)
Dept: FAMILY MEDICINE CLINIC | Facility: CLINIC | Age: 36
End: 2021-10-28

## 2021-10-28 DIAGNOSIS — Z20.822 EXPOSURE TO COVID-19 VIRUS: Primary | ICD-10-CM

## 2021-10-28 PROCEDURE — U0005 INFEC AGEN DETEC AMPLI PROBE: HCPCS | Performed by: INTERNAL MEDICINE

## 2021-10-28 PROCEDURE — U0003 INFECTIOUS AGENT DETECTION BY NUCLEIC ACID (DNA OR RNA); SEVERE ACUTE RESPIRATORY SYNDROME CORONAVIRUS 2 (SARS-COV-2) (CORONAVIRUS DISEASE [COVID-19]), AMPLIFIED PROBE TECHNIQUE, MAKING USE OF HIGH THROUGHPUT TECHNOLOGIES AS DESCRIBED BY CMS-2020-01-R: HCPCS | Performed by: INTERNAL MEDICINE

## 2021-10-29 ENCOUNTER — TELEMEDICINE (OUTPATIENT)
Dept: FAMILY MEDICINE CLINIC | Facility: CLINIC | Age: 36
End: 2021-10-29
Payer: COMMERCIAL

## 2021-10-29 DIAGNOSIS — U07.1 COVID-19: Primary | ICD-10-CM

## 2021-10-29 PROCEDURE — 99213 OFFICE O/P EST LOW 20 MIN: CPT | Performed by: INTERNAL MEDICINE

## 2022-04-11 DIAGNOSIS — E87.6 HYPOKALEMIA: ICD-10-CM

## 2022-04-11 RX ORDER — POTASSIUM CHLORIDE 1500 MG/1
TABLET, EXTENDED RELEASE ORAL
Qty: 90 TABLET | Refills: 3 | Status: SHIPPED | OUTPATIENT
Start: 2022-04-11

## 2022-04-27 DIAGNOSIS — I10 ESSENTIAL HYPERTENSION: ICD-10-CM

## 2022-04-27 RX ORDER — LISINOPRIL AND HYDROCHLOROTHIAZIDE 25; 20 MG/1; MG/1
TABLET ORAL
Qty: 180 TABLET | Refills: 3 | Status: SHIPPED | OUTPATIENT
Start: 2022-04-27

## 2022-08-22 ENCOUNTER — APPOINTMENT (OUTPATIENT)
Dept: RADIOLOGY | Facility: MEDICAL CENTER | Age: 37
End: 2022-08-22
Payer: COMMERCIAL

## 2022-08-22 DIAGNOSIS — M25.472 LEFT ANKLE SWELLING: ICD-10-CM

## 2022-08-22 DIAGNOSIS — M25.472 LEFT ANKLE SWELLING: Primary | ICD-10-CM

## 2022-08-22 PROCEDURE — 73630 X-RAY EXAM OF FOOT: CPT

## 2022-08-22 PROCEDURE — 73610 X-RAY EXAM OF ANKLE: CPT

## 2022-12-19 ENCOUNTER — OFFICE VISIT (OUTPATIENT)
Dept: FAMILY MEDICINE CLINIC | Facility: CLINIC | Age: 37
End: 2022-12-19

## 2022-12-19 VITALS
SYSTOLIC BLOOD PRESSURE: 124 MMHG | WEIGHT: 257.4 LBS | DIASTOLIC BLOOD PRESSURE: 78 MMHG | RESPIRATION RATE: 18 BRPM | TEMPERATURE: 97.3 F | BODY MASS INDEX: 38.12 KG/M2 | HEIGHT: 69 IN | HEART RATE: 78 BPM

## 2022-12-19 DIAGNOSIS — I10 BENIGN ESSENTIAL HYPERTENSION: ICD-10-CM

## 2022-12-19 DIAGNOSIS — Z00.00 ANNUAL PHYSICAL EXAM: Primary | ICD-10-CM

## 2022-12-19 PROBLEM — M22.40 CHONDROMALACIA PATELLAE: Status: ACTIVE | Noted: 2022-12-19

## 2022-12-19 NOTE — PROGRESS NOTES
[address] [facility]    NAME: Livan Sandhu  AGE: 40 y o  SEX: female  : 1985     DATE: 2022     Assessment and Plan:     Problem List Items Addressed This Visit        Cardiovascular and Mediastinum    Benign essential hypertension       Other    Annual physical exam - Primary    Relevant Orders    Lipid panel    Comprehensive metabolic panel   Rx for fbw  Resume exercise   Stay hydrated Continue current rx   Rto 6 months     Immunizations and preventive care screenings were discussed with patient today  Appropriate education was printed on patient's after visit summary  Counseling:  · Exercise: the importance of regular exercise/physical activity was discussed  Recommend exercise 3-5 times per week for at least 30 minutes  BMI Counseling: Body mass index is 38 01 kg/m²  The BMI is above normal  Nutrition recommendations include consuming healthier snacks and increasing intake of lean protein  Exercise recommendations include exercising 3-5 times per week  Rationale for BMI follow-up plan is due to patient being overweight or obese  Depression Screening and Follow-up Plan: Patient was screened for depression during today's encounter  They screened negative with a PHQ-2 score of 0  Return in about 6 months (around 2023), or if symptoms worsen or fail to improve, for Recheck  Chief Complaint:     Chief Complaint   Patient presents with   • Annual Exam      History of Present Illness:     Adult Annual Physical   Patient here for a comprehensive physical exam  The patient reports problems - Patient generally well Patient has not been exercising and diet not as optimized No acute issues   Diet and Physical Activity  · Diet/Nutrition: limited junk food  · Exercise: no formal exercise        Depression Screening  PHQ-2/9 Depression Screening    Little interest or pleasure in doing things: 0 - not at all  Feeling down, depressed, or hopeless: 0 - not at all  PHQ-2 Score: 0  PHQ-2 Interpretation: Negative depression screen       General Health  · Sleep: gets 7-8 hours of sleep on average  · Hearing: normal - bilateral   · Vision: no vision problems  · Dental: regular dental visits  /GYN Health  · Last menstrual period: November  · Contraceptive method: barrier methods  · History of STDs?: no      Review of Systems:     Review of Systems   Constitutional: Negative for chills and fever  HENT: Negative  Eyes: Negative for visual disturbance  Respiratory: Negative for cough and shortness of breath  Cardiovascular: Negative for chest pain, palpitations and leg swelling  Gastrointestinal: Negative for abdominal distention and abdominal pain  Genitourinary: Negative  Neurological: Negative for dizziness, light-headedness and headaches  Psychiatric/Behavioral: Negative for sleep disturbance  The patient is not nervous/anxious         Past Medical History:     Past Medical History:   Diagnosis Date   • Hypertension    • Skin cancer (melanoma) ([facility] [address] )       Past Surgical History:     Past Surgical History:   Procedure Laterality Date   • CHOLECYSTECTOMY  2005   • PILONIDAL CYST DRAINAGE      incision and drainage; unknown   • SKIN SURGERY        Social History:     Social History     Socioeconomic History   • Marital status: Single     Spouse name: None   • Number of children: None   • Years of education: None   • Highest education level: None   Occupational History   • None   Tobacco Use   • Smoking status: Never   • Smokeless tobacco: Never   Vaping Use   • Vaping Use: Never used   Substance and Sexual Activity   • Alcohol use: No   • Drug use: No   • Sexual activity: Never   Other Topics Concern   • None   Social History Narrative    Caffeine use    Dental care, regularly    Good dental hygiene    Anglican    Sun protection sunscreen    Uses safety equipment-seatbelts     Social Determinants of Health Financial Resource Strain: Not on file   Food Insecurity: Not on file   Transportation Needs: Not on file   Physical Activity: Not on file   Stress: Not on file   Social Connections: Not on file   Intimate Partner Violence: Not on file   Housing Stability: Not on file      Family History:     Family History   Problem Relation Age of Onset   • Hypertension Father    • Coronary artery disease Maternal Grandmother    • Alzheimer's disease Paternal Grandmother    • Melanoma Paternal Grandfather    • No Known Problems Mother    • Melanoma Maternal Grandfather       Current Medications:     Current Outpatient Medications   Medication Sig Dispense Refill   • Klor-Con M20 20 MEQ tablet TAKE 1 TABLET DAILY 90 tablet 3   • lisinopril-hydrochlorothiazide (PRINZIDE,ZESTORETIC) 20-25 MG per tablet TAKE 1 TABLET TWICE A  tablet 3   • Multiple Vitamin (MULTI-VITAMIN DAILY PO) Take by mouth       No current facility-administered medications for this visit  Allergies: Allergies   Allergen Reactions   • Amoxicillin    • Latex    • Other      Adhesive Tape   • Sulfa Antibiotics       Physical Exam:     /78   Pulse 78   Temp (!) 97 3 °F (36 3 °C) (Temporal)   Resp 18   Ht 5' 9" (1 753 m)   Wt 117 kg (257 lb 6 4 oz)   BMI 38 01 kg/m²     Physical Exam  Vitals reviewed  Constitutional:       General: She is not in acute distress  Appearance: Normal appearance  She is not ill-appearing, toxic-appearing or diaphoretic  HENT:      Head: Normocephalic and atraumatic  Right Ear: External ear normal       Left Ear: External ear normal       Nose: Nose normal       Mouth/Throat:      Mouth: Mucous membranes are dry  Eyes:      General: No scleral icterus  Extraocular Movements: Extraocular movements intact  Conjunctiva/sclera: Conjunctivae normal       Pupils: Pupils are equal, round, and reactive to light  Cardiovascular:      Rate and Rhythm: Normal rate and regular rhythm  Pulses: Normal pulses  Heart sounds: Normal heart sounds  No murmur heard  Pulmonary:      Effort: Pulmonary effort is normal  No respiratory distress  Breath sounds: Normal breath sounds  No wheezing  Abdominal:      General: Bowel sounds are normal  There is no distension  Palpations: Abdomen is soft  Tenderness: There is no abdominal tenderness  Musculoskeletal:      Cervical back: Normal range of motion and neck supple  No rigidity  Right lower leg: No edema  Left lower leg: No edema  Lymphadenopathy:      Cervical: No cervical adenopathy  Skin:     General: Skin is dry  Coloration: Skin is not jaundiced or pale  Neurological:      General: No focal deficit present  Mental Status: She is alert and oriented to person, place, and time  Mental status is at baseline  Psychiatric:         Mood and Affect: Mood normal          Behavior: Behavior normal          Thought Content:  Thought content normal          Judgment: Judgment normal           Diego Vuong DO   [facility]

## 2022-12-19 NOTE — PATIENT INSTRUCTIONS

## 2023-04-03 ENCOUNTER — OFFICE VISIT (OUTPATIENT)
Dept: URGENT CARE | Facility: MEDICAL CENTER | Age: 38
End: 2023-04-03

## 2023-04-03 VITALS
OXYGEN SATURATION: 99 % | HEART RATE: 116 BPM | TEMPERATURE: 98.5 F | HEIGHT: 69 IN | RESPIRATION RATE: 20 BRPM | WEIGHT: 245 LBS | BODY MASS INDEX: 36.29 KG/M2

## 2023-04-03 DIAGNOSIS — J01.90 ACUTE SINUSITIS, RECURRENCE NOT SPECIFIED, UNSPECIFIED LOCATION: Primary | ICD-10-CM

## 2023-04-03 RX ORDER — AZITHROMYCIN 250 MG/1
TABLET, FILM COATED ORAL
Qty: 6 TABLET | Refills: 0 | Status: SHIPPED | OUTPATIENT
Start: 2023-04-03 | End: 2023-04-07

## 2023-04-03 RX ORDER — GUAIFENESIN 600 MG/1
1200 TABLET, EXTENDED RELEASE ORAL EVERY 12 HOURS SCHEDULED
Qty: 30 TABLET | Refills: 0 | Status: SHIPPED | OUTPATIENT
Start: 2023-04-03

## 2023-04-03 NOTE — PATIENT INSTRUCTIONS
You may take over the counter Tylenol (Acetaminophen) and/or Motrin (Ibuprofen) as needed, as directed on packaging  Be sure to get plenty of rest, and drinking fluids to remain hydrated  Over the counter decongestants can be used, only as directed on the box  However if you have any history of cardiac disease or high blood pressure these should be avoided, as we caution the use of these since they can make place strain on your heart and cause increase in blood pressure  It is recommended in lieu of decongestants to use cool mist vaporizer, saline nasal spray to relieve nasal congestion  It is also important to remain hydrated and drink plenty of fluids (avoiding caffeine and alcohol)  You may also use a neti-pot type device to help clear your sinuses

## 2023-04-03 NOTE — PROGRESS NOTES
[facility] [employer] [facility]        NAME: Tere Ferguson is a 41 y o  female  : 1985    MRN: [id number]  DATE: April 3, 2023  TIME: 12:18 PM    Assessment and Plan   Acute sinusitis, recurrence not specified, unspecified location [J01 90]  1  Acute sinusitis, recurrence not specified, unspecified location  azithromycin (ZITHROMAX) 250 mg tablet    guaiFENesin (MUCINEX) 600 mg 12 hr tablet            Patient Instructions       Follow up with PCP in 3-5 days  Proceed to  ER if symptoms worsen  Chief Complaint     Chief Complaint   Patient presents with   • Flu Symptoms     Sore throat, nasal congestion and drainage, cough headache, eye drainage, symptoms started last Tuesday          History of Present Illness       Tuesday last week started with sinus congestion, sore throat, runny nose, and cough  She has also had on/off headaches  She has not had any fevers  She did just a mole removed on Wednesday so wasn't sure if temp of 99 1 was related to that or the sinus congestion  Taking coricidin at home with some relief  Worse in the AM and night time  Denies any SOB/CP  She has not used a nasal spray at all  She has been taking tylenol PM without relief  Home covid test this AM was negative  Review of Systems   Review of Systems   Constitutional: Negative for chills and fever  HENT: Positive for congestion, postnasal drip, rhinorrhea, sinus pressure, sinus pain, sore throat and trouble swallowing  Negative for ear pain  Slight pain on the right side of face around her ear/jaw with sinus pressure  Eyes: Negative for pain and visual disturbance  Respiratory: Positive for cough  Negative for shortness of breath  Productive cough in the AM/Night, but not during the day  Cardiovascular: Negative for chest pain and palpitations  Gastrointestinal: Negative for abdominal pain, constipation, diarrhea, nausea and vomiting  Genitourinary: Negative for dysuria and hematuria  Musculoskeletal: Negative for arthralgias and back pain  Skin: Negative for color change and rash  Neurological: Positive for headaches  Negative for dizziness, seizures, syncope and light-headedness  All other systems reviewed and are negative  Current Medications       Current Outpatient Medications:   •  azithromycin (ZITHROMAX) 250 mg tablet, Take 2 tablets today then 1 tablet daily x 4 days, Disp: 6 tablet, Rfl: 0  •  guaiFENesin (MUCINEX) 600 mg 12 hr tablet, Take 2 tablets (1,200 mg total) by mouth every 12 (twelve) hours, Disp: 30 tablet, Rfl: 0  •  Klor-Con M20 20 MEQ tablet, TAKE 1 TABLET DAILY, Disp: 90 tablet, Rfl: 3  •  lisinopril-hydrochlorothiazide (PRINZIDE,ZESTORETIC) 20-25 MG per tablet, TAKE 1 TABLET TWICE A DAY, Disp: 180 tablet, Rfl: 3  •  Multiple Vitamin (MULTI-VITAMIN DAILY PO), Take by mouth, Disp: , Rfl:     Current Allergies     Allergies as of 04/03/2023 - Reviewed 04/03/2023   Allergen Reaction Noted   • Amoxicillin  04/25/2013   • Latex  04/25/2013   • Other  04/25/2013   • Sulfa antibiotics  04/25/2013            The following portions of the patient's history were reviewed and updated as appropriate: allergies, current medications, past family history, past medical history, past social history, past surgical history and problem list      Past Medical History:   Diagnosis Date   • Hypertension    • Skin cancer (melanoma) ([facility] [address] )        Past Surgical History:   Procedure Laterality Date   • CHOLECYSTECTOMY  2005   • PILONIDAL CYST DRAINAGE      incision and drainage; unknown   • SKIN SURGERY         Family History   Problem Relation Age of Onset   • Hypertension Father    • Coronary artery disease Maternal Grandmother    • Alzheimer's disease Paternal Grandmother    • Melanoma Paternal Grandfather    • No Known Problems Mother    • Melanoma Maternal Grandfather          Medications have been verified          Objective   Pulse (!) 116   Temp 98 5 °F (36 9 °C)   Resp 20 "Ht 5' 9\" (1 753 m)   Wt 111 kg (245 lb)   LMP 03/13/2023   SpO2 99%   BMI 36 18 kg/m²        Physical Exam     Physical Exam  Vitals and nursing note reviewed  Constitutional:       General: She is awake  She is not in acute distress  Appearance: Normal appearance  She is well-developed, well-groomed and normal weight  She is not ill-appearing  HENT:      Head: Normocephalic and atraumatic  Jaw: There is normal jaw occlusion  No trismus, tenderness, swelling, pain on movement or malocclusion  Right Ear: Ear canal and external ear normal  A middle ear effusion is present  Tympanic membrane is erythematous and bulging  Left Ear: Tympanic membrane, ear canal and external ear normal       Nose: Nose normal       Mouth/Throat:      Lips: Pink  Mouth: Mucous membranes are moist       Pharynx: Oropharynx is clear  Uvula midline  No pharyngeal swelling, oropharyngeal exudate, posterior oropharyngeal erythema or uvula swelling  Tonsils: No tonsillar exudate or tonsillar abscesses  0 on the right  0 on the left  Eyes:      Extraocular Movements: Extraocular movements intact  Conjunctiva/sclera: Conjunctivae normal       Pupils: Pupils are equal, round, and reactive to light  Cardiovascular:      Rate and Rhythm: Regular rhythm  Tachycardia present  Pulses: Normal pulses  Heart sounds: Normal heart sounds, S1 normal and S2 normal    Pulmonary:      Effort: Pulmonary effort is normal       Breath sounds: Examination of the right-lower field reveals decreased breath sounds  Examination of the left-lower field reveals decreased breath sounds  Decreased breath sounds present  Abdominal:      General: Abdomen is flat  Bowel sounds are normal       Palpations: Abdomen is soft  Musculoskeletal:         General: Normal range of motion  Cervical back: Normal range of motion and neck supple  Right lower leg: No edema  Left lower leg: No edema     Skin:     " General: Skin is warm and dry  Capillary Refill: Capillary refill takes less than 2 seconds  Neurological:      General: No focal deficit present  Mental Status: She is alert and oriented to person, place, and time  Psychiatric:         Mood and Affect: Mood normal          Behavior: Behavior normal  Behavior is cooperative

## 2023-04-06 DIAGNOSIS — E87.6 HYPOKALEMIA: ICD-10-CM

## 2023-04-06 RX ORDER — POTASSIUM CHLORIDE 1500 MG/1
TABLET, EXTENDED RELEASE ORAL
Qty: 90 TABLET | Refills: 3 | Status: SHIPPED | OUTPATIENT
Start: 2023-04-06

## 2023-04-24 DIAGNOSIS — I10 ESSENTIAL HYPERTENSION: ICD-10-CM

## 2023-04-24 RX ORDER — LISINOPRIL AND HYDROCHLOROTHIAZIDE 25; 20 MG/1; MG/1
TABLET ORAL
Qty: 180 TABLET | Refills: 3 | Status: SHIPPED | OUTPATIENT
Start: 2023-04-24

## 2023-06-08 ENCOUNTER — RA CDI HCC (OUTPATIENT)
Dept: OTHER | Facility: HOSPITAL | Age: 38
End: 2023-06-08

## 2023-06-08 NOTE — PROGRESS NOTES
[facility]  coding opportunities       Chart reviewed, no opportunity found: CHART REVIEWED, NO OPPORTUNITY FOUND        Patients Insurance        Commercial Insurance: [employer]

## 2023-08-29 ENCOUNTER — OFFICE VISIT (OUTPATIENT)
Dept: FAMILY MEDICINE CLINIC | Facility: CLINIC | Age: 38
End: 2023-08-29
Payer: COMMERCIAL

## 2023-08-29 VITALS
RESPIRATION RATE: 18 BRPM | DIASTOLIC BLOOD PRESSURE: 78 MMHG | HEIGHT: 69 IN | HEART RATE: 70 BPM | SYSTOLIC BLOOD PRESSURE: 124 MMHG | TEMPERATURE: 97.8 F | WEIGHT: 259.4 LBS | BODY MASS INDEX: 38.42 KG/M2

## 2023-08-29 DIAGNOSIS — E78.2 MIXED HYPERLIPIDEMIA: ICD-10-CM

## 2023-08-29 DIAGNOSIS — Z86.39 HISTORY OF HYPOKALEMIA: ICD-10-CM

## 2023-08-29 DIAGNOSIS — I10 BENIGN ESSENTIAL HYPERTENSION: Primary | ICD-10-CM

## 2023-08-29 PROBLEM — H70.001 MASTOIDITIS, ACUTE, RIGHT: Status: RESOLVED | Noted: 2021-05-14 | Resolved: 2023-08-29

## 2023-08-29 PROBLEM — U07.1 COVID-19: Status: RESOLVED | Noted: 2021-10-29 | Resolved: 2023-08-29

## 2023-08-29 PROCEDURE — 99214 OFFICE O/P EST MOD 30 MIN: CPT | Performed by: INTERNAL MEDICINE

## 2023-08-29 NOTE — PROGRESS NOTES
Name: Pepito Germain      : 1985      MRN: [id number]  Encounter Provider: Marianna Trujillo DO  Encounter Date: 2023   Encounter department: [facility]    Assessment & Plan     1. Benign essential hypertension  No added salt diet  Resume exercise program    2. Mixed hyperlipidemia  Low fat diet and exercise    3. History of hypokalemia  Has rx for fbw        BMI Counseling: Body mass index is 38.31 kg/m². The BMI is above normal. Nutrition recommendations include increasing intake of lean protein. Exercise recommendations include exercising 3-5 times per week. Rationale for BMI follow-up plan is due to patient being overweight or obese. Depression Screening and Follow-up Plan: Patient was screened for depression during today's encounter. They screened negative with a PHQ-2 score of 0. Rto 6 months     Subjective      HPI   Pt generally well Did not have labs yet No chest pain or sob Has not been exercising regularly but working on her new home and renovating Has been staying hydrated   Review of Systems   Constitutional: Negative for chills and fever. HENT: Negative. Eyes: Negative for visual disturbance. Respiratory: Negative for cough and shortness of breath. Cardiovascular: Negative. Gastrointestinal: Negative. Genitourinary: Negative. Musculoskeletal: Negative. Neurological: Negative. Psychiatric/Behavioral: Negative.         Current Outpatient Medications on File Prior to Visit   Medication Sig   • Klor-Con M20 20 MEQ tablet TAKE 1 TABLET DAILY   • lisinopril-hydrochlorothiazide (PRINZIDE,ZESTORETIC) 20-25 MG per tablet TAKE 1 TABLET TWICE A DAY   • Multiple Vitamin (MULTI-VITAMIN DAILY PO) Take by mouth   • guaiFENesin (MUCINEX) 600 mg 12 hr tablet Take 2 tablets (1,200 mg total) by mouth every 12 (twelve) hours   • methylPREDNISolone 4 MG tablet therapy pack Use as directed on package   • predniSONE 10 mg tablet 5 tabs once daily x 2 days then decrease by 1 tab every other day until finished       Objective     /78   Pulse 70   Temp 97.8 °F (36.6 °C) (Temporal)   Resp 18   Ht 5' 9" (1.753 m)   Wt 118 kg (259 lb 6.4 oz)   BMI 38.31 kg/m²     Physical Exam  Vitals reviewed. Constitutional:       General: She is not in acute distress. Appearance: Normal appearance. She is not ill-appearing, toxic-appearing or diaphoretic. HENT:      Head: Normocephalic and atraumatic. Right Ear: External ear normal.      Left Ear: External ear normal.      Nose: Nose normal.      Mouth/Throat:      Mouth: Mucous membranes are moist.   Eyes:      General: No scleral icterus. Extraocular Movements: Extraocular movements intact. Conjunctiva/sclera: Conjunctivae normal.      Pupils: Pupils are equal, round, and reactive to light. Cardiovascular:      Rate and Rhythm: Normal rate and regular rhythm. Pulses: Normal pulses. Heart sounds: Normal heart sounds. Pulmonary:      Effort: Pulmonary effort is normal. No respiratory distress. Breath sounds: Normal breath sounds. No wheezing. Abdominal:      General: Bowel sounds are normal. There is no distension. Palpations: Abdomen is soft. Tenderness: There is no abdominal tenderness. Musculoskeletal:      Cervical back: Normal range of motion and neck supple. Right lower leg: No edema. Left lower leg: No edema. Lymphadenopathy:      Cervical: No cervical adenopathy. Skin:     General: Skin is warm and dry. Coloration: Skin is not jaundiced or pale. Neurological:      General: No focal deficit present. Mental Status: She is alert and oriented to person, place, and time. Mental status is at baseline. Cranial Nerves: No cranial nerve deficit. Sensory: No sensory deficit. Psychiatric:         Mood and Affect: Mood normal.         Behavior: Behavior normal.         Thought Content:  Thought content normal.         Judgment: Judgment normal. Sierra Nj DO

## 2024-04-01 DIAGNOSIS — E87.6 HYPOKALEMIA: ICD-10-CM

## 2024-04-01 RX ORDER — POTASSIUM CHLORIDE 1500 MG/1
TABLET, EXTENDED RELEASE ORAL
Qty: 90 TABLET | Refills: 3 | Status: SHIPPED | OUTPATIENT
Start: 2024-04-01

## 2024-04-16 DIAGNOSIS — I10 ESSENTIAL HYPERTENSION: ICD-10-CM

## 2024-04-16 RX ORDER — LISINOPRIL AND HYDROCHLOROTHIAZIDE 25; 20 MG/1; MG/1
TABLET ORAL
Qty: 180 TABLET | Refills: 3 | Status: SHIPPED | OUTPATIENT
Start: 2024-04-16

## 2024-04-25 ENCOUNTER — TELEPHONE (OUTPATIENT)
Dept: FAMILY MEDICINE CLINIC | Facility: CLINIC | Age: 39
End: 2024-04-25

## 2024-04-25 ENCOUNTER — APPOINTMENT (OUTPATIENT)
Dept: LAB | Facility: MEDICAL CENTER | Age: 39
End: 2024-04-25
Payer: COMMERCIAL

## 2024-04-25 DIAGNOSIS — Z00.00 ENCOUNTER FOR PREVENTIVE HEALTH EXAMINATION: Primary | ICD-10-CM

## 2024-04-25 DIAGNOSIS — Z00.00 ENCOUNTER FOR PREVENTIVE HEALTH EXAMINATION: ICD-10-CM

## 2024-04-25 LAB
ALBUMIN SERPL BCP-MCNC: 4.5 G/DL (ref 3.5–5)
ALP SERPL-CCNC: 59 U/L (ref 34–104)
ALT SERPL W P-5'-P-CCNC: 24 U/L (ref 7–52)
ANION GAP SERPL CALCULATED.3IONS-SCNC: 9 MMOL/L (ref 4–13)
AST SERPL W P-5'-P-CCNC: 20 U/L (ref 13–39)
BILIRUB SERPL-MCNC: 0.6 MG/DL (ref 0.2–1)
BUN SERPL-MCNC: 13 MG/DL (ref 5–25)
CALCIUM SERPL-MCNC: 9.5 MG/DL (ref 8.4–10.2)
CHLORIDE SERPL-SCNC: 99 MMOL/L (ref 96–108)
CHOLEST SERPL-MCNC: 163 MG/DL
CO2 SERPL-SCNC: 31 MMOL/L (ref 21–32)
CREAT SERPL-MCNC: 0.68 MG/DL (ref 0.6–1.3)
GFR SERPL CREATININE-BSD FRML MDRD: 111 ML/MIN/1.73SQ M
GLUCOSE P FAST SERPL-MCNC: 82 MG/DL (ref 65–99)
HDLC SERPL-MCNC: 55 MG/DL
LDLC SERPL CALC-MCNC: 90 MG/DL (ref 0–100)
NONHDLC SERPL-MCNC: 108 MG/DL
POTASSIUM SERPL-SCNC: 3.3 MMOL/L (ref 3.5–5.3)
PROT SERPL-MCNC: 7.4 G/DL (ref 6.4–8.4)
SODIUM SERPL-SCNC: 139 MMOL/L (ref 135–147)
TRIGL SERPL-MCNC: 91 MG/DL

## 2024-04-25 PROCEDURE — 80061 LIPID PANEL: CPT

## 2024-04-25 PROCEDURE — 80053 COMPREHEN METABOLIC PANEL: CPT

## 2024-04-25 PROCEDURE — 36415 COLL VENOUS BLD VENIPUNCTURE: CPT

## 2024-04-25 NOTE — TELEPHONE ENCOUNTER
Pt came into office asking if there are any labs you would like done before her annual pe next week. Please advise.

## 2024-04-29 ENCOUNTER — OFFICE VISIT (OUTPATIENT)
Dept: FAMILY MEDICINE CLINIC | Facility: CLINIC | Age: 39
End: 2024-04-29
Payer: COMMERCIAL

## 2024-04-29 VITALS
BODY MASS INDEX: 38.89 KG/M2 | SYSTOLIC BLOOD PRESSURE: 124 MMHG | OXYGEN SATURATION: 98 % | TEMPERATURE: 97.4 F | RESPIRATION RATE: 18 BRPM | WEIGHT: 262.6 LBS | DIASTOLIC BLOOD PRESSURE: 78 MMHG | HEIGHT: 69 IN | HEART RATE: 74 BPM

## 2024-04-29 DIAGNOSIS — E87.6 HYPOKALEMIA: ICD-10-CM

## 2024-04-29 DIAGNOSIS — Z00.00 ANNUAL PHYSICAL EXAM: Primary | ICD-10-CM

## 2024-04-29 PROCEDURE — 99395 PREV VISIT EST AGE 18-39: CPT | Performed by: INTERNAL MEDICINE

## 2024-04-29 PROCEDURE — 3725F SCREEN DEPRESSION PERFORMED: CPT | Performed by: INTERNAL MEDICINE

## 2024-04-29 NOTE — PROGRESS NOTES
ADULT ANNUAL PHYSICAL  [facility]    NAME: Zo Barone  AGE: 39 y.o. SEX: female  : 1985     DATE: 2024     Assessment and Plan:     Problem List Items Addressed This Visit          Other    Annual physical exam - Primary    Relevant Orders    Ambulatory Referral to Obstetrics / Gynecology     Other Visit Diagnoses       Hypokalemia        Relevant Orders    Basic metabolic panel        Recheck potassium Discussed increased hydration since exercising/sports drink/hydration packets   She will take 2 potassium tablets 2 days/week  Exercise regularly  Coming up on due for Gyn and pt will call for appt   Annual/prn    Immunizations and preventive care screenings were discussed with patient today. Appropriate education was printed on patient's after visit summary.    Counseling:  Exercise: the importance of regular exercise/physical activity was discussed. Recommend exercise 3-5 times per week for at least 30 minutes.       Depression Screening and Follow-up Plan: Patient was screened for depression during today's encounter. They screened negative with a PHQ-2 score of 0.        Return in about 1 year (around 2025), or if symptoms worsen or fail to improve, for Recheck.     Chief Complaint:     Chief Complaint   Patient presents with    Physical Exam      History of Present Illness:     Adult Annual Physical   Patient here for a comprehensive physical exam. The patient reports problems - Annual PE  . Pt started walking again at least 2 days weather permitting Generally good health Had recent labs     Diet and Physical Activity  Diet/Nutrition: well balanced diet.   Exercise: 3-4 times a week on average.      Depression Screening  PHQ-2/9 Depression Screening    Little interest or pleasure in doing things: 0 - not at all  Feeling down, depressed, or hopeless: 0 - not at all  PHQ-2 Score: 0  PHQ-2 Interpretation: Negative depression screen       General  Health  Sleep: sleeps well and gets 7-8 hours of sleep on average.   Hearing: normal - bilateral.  Vision: no vision problems.   Dental: regular dental visits.       /GYN Health  Follows with gynecology? yes   Last menstrual period: April  Contraceptive method: barrier methods.  History of STDs?: no.     Advanced Care Planning  Do you have an advanced directive? no  Do you have a durable medical power of ? no  ACP document given to the patient? no      Review of Systems:     Review of Systems   Constitutional:  Negative for chills and fever.   HENT: Negative.     Eyes:  Negative for visual disturbance.   Respiratory:  Negative for cough and shortness of breath.    Cardiovascular:  Negative for chest pain, palpitations and leg swelling.   Gastrointestinal:  Negative for abdominal distention and abdominal pain.   Genitourinary: Negative.    Musculoskeletal: Negative.    Neurological:  Negative for dizziness, light-headedness and headaches.   Psychiatric/Behavioral:  Negative for sleep disturbance. The patient is not nervous/anxious.       Past Medical History:     Past Medical History:   Diagnosis Date    Hypertension     Mastoiditis, acute, right 5/14/2021    Skin cancer (melanoma) (HCC)       Past Surgical History:     Past Surgical History:   Procedure Laterality Date    CHOLECYSTECTOMY  2005    PILONIDAL CYST DRAINAGE      incision and drainage; unknown    SKIN SURGERY        Social History:     Social History     Socioeconomic History    Marital status: Single     Spouse name: None    Number of children: None    Years of education: None    Highest education level: None   Occupational History    None   Tobacco Use    Smoking status: Never     Passive exposure: Never    Smokeless tobacco: Never   Vaping Use    Vaping status: Never Used   Substance and Sexual Activity    Alcohol use: No    Drug use: No    Sexual activity: Never   Other Topics Concern    None   Social History Narrative    Caffeine use     "Dental care, regularly    Good dental hygiene    [facility]    Sun protection sunscreen    Uses safety equipment-seatbelts     Social Determinants of Health     Financial Resource Strain: Not on file   Food Insecurity: Not on file   Transportation Needs: Not on file   Physical Activity: Not on file   Stress: Not on file   Social Connections: Not on file   Intimate Partner Violence: Not on file   Housing Stability: Not on file      Family History:     Family History   Problem Relation Age of Onset    Hypertension Father     Coronary artery disease Maternal Grandmother     Alzheimer's disease Paternal Grandmother     Melanoma Paternal Grandfather     No Known Problems Mother     Melanoma Maternal Grandfather       Current Medications:     Current Outpatient Medications   Medication Sig Dispense Refill    Klor-Con M20 20 MEQ tablet TAKE 1 TABLET DAILY 90 tablet 3    lisinopril-hydrochlorothiazide (PRINZIDE,ZESTORETIC) 20-25 MG per tablet TAKE 1 TABLET TWICE A  tablet 3    Multiple Vitamin (MULTI-VITAMIN DAILY PO) Take by mouth       No current facility-administered medications for this visit.      Allergies:     Allergies   Allergen Reactions    Amoxicillin     Latex     Other      Adhesive Tape    Sulfa Antibiotics       Physical Exam:     /78   Pulse 74   Temp (!) 97.4 °F (36.3 °C) (Temporal)   Resp 18   Ht 5' 9\" (1.753 m)   Wt 119 kg (262 lb 9.6 oz)   SpO2 98%   BMI 38.78 kg/m²     Physical Exam  Vitals and nursing note reviewed.   Constitutional:       General: She is not in acute distress.     Appearance: Normal appearance. She is not ill-appearing, toxic-appearing or diaphoretic.   HENT:      Head: Normocephalic and atraumatic.      Right Ear: External ear normal.      Left Ear: External ear normal.      Nose: Nose normal.      Mouth/Throat:      Mouth: Mucous membranes are moist.   Eyes:      General: No scleral icterus.     Extraocular Movements: Extraocular movements intact.      " Conjunctiva/sclera: Conjunctivae normal.      Pupils: Pupils are equal, round, and reactive to light.   Cardiovascular:      Rate and Rhythm: Normal rate and regular rhythm.      Pulses: Normal pulses.      Heart sounds: Normal heart sounds. No murmur heard.  Pulmonary:      Effort: Pulmonary effort is normal.      Breath sounds: Normal breath sounds.   Abdominal:      General: Bowel sounds are normal. There is no distension.      Palpations: Abdomen is soft.      Tenderness: There is no abdominal tenderness.   Musculoskeletal:      Cervical back: Normal range of motion and neck supple.      Right lower leg: No edema.      Left lower leg: No edema.   Lymphadenopathy:      Cervical: No cervical adenopathy.   Skin:     General: Skin is warm and dry.   Neurological:      General: No focal deficit present.      Mental Status: She is alert and oriented to person, place, and time. Mental status is at baseline.   Psychiatric:         Mood and Affect: Mood normal.         Behavior: Behavior normal.         Thought Content: Thought content normal.         Judgment: Judgment normal.          Freda Arevalo [facility]

## 2024-05-06 DIAGNOSIS — L24.7 IRRITANT CONTACT DERMATITIS DUE TO PLANTS, EXCEPT FOOD: Primary | ICD-10-CM

## 2024-05-06 RX ORDER — PREDNISONE 20 MG/1
20 TABLET ORAL DAILY
Qty: 5 TABLET | Refills: 0 | Status: SHIPPED | OUTPATIENT
Start: 2024-05-06

## 2024-07-02 ENCOUNTER — OFFICE VISIT (OUTPATIENT)
Dept: URGENT CARE | Facility: MEDICAL CENTER | Age: 39
End: 2024-07-02
Payer: COMMERCIAL

## 2024-07-02 VITALS
HEIGHT: 68 IN | HEART RATE: 84 BPM | WEIGHT: 261 LBS | DIASTOLIC BLOOD PRESSURE: 104 MMHG | SYSTOLIC BLOOD PRESSURE: 150 MMHG | OXYGEN SATURATION: 99 % | RESPIRATION RATE: 20 BRPM | BODY MASS INDEX: 39.56 KG/M2 | TEMPERATURE: 98.1 F

## 2024-07-02 DIAGNOSIS — L24.A9 IRRITANT CONTACT DERMATITIS DUE TO SWEAT: Primary | ICD-10-CM

## 2024-07-02 PROCEDURE — 99213 OFFICE O/P EST LOW 20 MIN: CPT | Performed by: STUDENT IN AN ORGANIZED HEALTH CARE EDUCATION/TRAINING PROGRAM

## 2024-07-02 RX ORDER — TRIAMCINOLONE ACETONIDE 0.25 MG/G
CREAM TOPICAL 2 TIMES DAILY
Qty: 80 G | Refills: 0 | Status: SHIPPED | OUTPATIENT
Start: 2024-07-02

## 2024-07-02 RX ORDER — FAMOTIDINE 20 MG/1
20 TABLET, FILM COATED ORAL 2 TIMES DAILY
Qty: 30 TABLET | Refills: 0 | Status: SHIPPED | OUTPATIENT
Start: 2024-07-02

## 2024-07-02 NOTE — PROGRESS NOTES
[facility]        NAME: Zo Barone is a 39 y.o. female  : 1985    MRN: [id number]    Assessment and Plan   Irritant contact dermatitis due to sweat [L24.A9]  1. Irritant contact dermatitis due to sweat  famotidine (PEPCID) 20 mg tablet    triamcinolone (KENALOG) 0.025 % cream        Given the nondiffuse nature of the rash, I do have concern for possible reaction to local irritant.  I do suspect possible sweat induced contact dermatitis given the locations in the bilateral antecubital fossa, low back, cleavage area without diffuse distribution.  Continue topical Benadryl, will add Pepcid for further antihistamine properties.  If no improvement in 3 to 5 days of using Pepcid, can trial Kenalog topical.  Recommend follow-up with PCP in 2 weeks.    Patient Instructions     See wrap up for details  Proceed to  ER if symptoms worsen.    Chief Complaint     Chief Complaint   Patient presents with    Urticaria     Started getting hives last Thursday. Various spots, comes and goes. Today on chest and face.          History of Present Illness     Rash  This is a new problem. The current episode started in the past 7 days. The problem has been gradually worsening since onset. The rash is diffuse. The rash is characterized by itchiness. It is unknown if there was an exposure to a precipitant. Pertinent negatives include no anorexia, congestion, cough, diarrhea, facial edema, fatigue, fever, joint pain, rhinorrhea, shortness of breath, sore throat or vomiting.     P/w onset of symptoms 5 days ago  Rash has been intermittent and at this time appears to be resolving.  Initially had lesions over the anterior forearms bilaterally including the antecubital fossa, lower extremities, lower back, cleavage area but was not completely diffuse.  Has been mildly pruritic, improving with topical Benadryl.  Denies any change in personal products except for a new nail color.  Does report that the AC in her house was not  working prior to onset of rash and she was sweating little excessively.    Review of Systems   Review of Systems   Constitutional:  Negative for chills, fatigue and fever.   HENT:  Negative for congestion, ear pain, rhinorrhea and sore throat.    Eyes:  Negative for pain and visual disturbance.   Respiratory:  Negative for cough, chest tightness and shortness of breath.    Cardiovascular:  Negative for chest pain and palpitations.   Gastrointestinal:  Negative for abdominal pain, anorexia, constipation, diarrhea, nausea and vomiting.   Genitourinary:  Negative for dysuria, hematuria and menstrual problem.   Musculoskeletal:  Negative for arthralgias, back pain and joint pain.   Skin:  Positive for rash. Negative for color change.   Neurological:  Negative for seizures and syncope.   Psychiatric/Behavioral:  Negative for dysphoric mood and suicidal ideas.    All other systems reviewed and are negative.    Current Medications       Current Outpatient Medications:     famotidine (PEPCID) 20 mg tablet, Take 1 tablet (20 mg total) by mouth 2 (two) times a day, Disp: 30 tablet, Rfl: 0    Klor-Con M20 20 MEQ tablet, TAKE 1 TABLET DAILY, Disp: 90 tablet, Rfl: 3    lisinopril-hydrochlorothiazide (PRINZIDE,ZESTORETIC) 20-25 MG per tablet, TAKE 1 TABLET TWICE A DAY, Disp: 180 tablet, Rfl: 3    Multiple Vitamin (MULTI-VITAMIN DAILY PO), Take by mouth, Disp: , Rfl:     triamcinolone (KENALOG) 0.025 % cream, Apply topically 2 (two) times a day, Disp: 80 g, Rfl: 0    predniSONE 20 mg tablet, Take 1 tablet (20 mg total) by mouth daily, Disp: 5 tablet, Rfl: 0    Current Allergies     Allergies as of 07/02/2024 - Reviewed 07/02/2024   Allergen Reaction Noted    Amoxicillin  04/25/2013    Latex  04/25/2013    Other  04/25/2013    Sulfa antibiotics  04/25/2013            The following portions of the patient's history were reviewed and updated as appropriate: allergies, current medications, past family history, past medical history,  "past social history, past surgical history and problem list.     Past Medical History:   Diagnosis Date    Hypertension     Mastoiditis, acute, right 5/14/2021    Skin cancer (melanoma) (HCC)        Past Surgical History:   Procedure Laterality Date    CHOLECYSTECTOMY  2005    PILONIDAL CYST DRAINAGE      incision and drainage; unknown    SKIN SURGERY         Family History   Problem Relation Age of Onset    Hypertension Father     Coronary artery disease Maternal Grandmother     Alzheimer's disease Paternal Grandmother     Melanoma Paternal Grandfather     No Known Problems Mother     Melanoma Maternal Grandfather          Medications have been verified.        Objective   BP (!) 150/104   Pulse 84   Temp 98.1 °F (36.7 °C)   Resp 20   Ht 5' 8\" (1.727 m)   Wt 118 kg (261 lb)   SpO2 99%   BMI 39.68 kg/m²        Physical Exam     Physical Exam  Constitutional:       General: She is not in acute distress.     Appearance: Normal appearance.   HENT:      Head: Normocephalic and atraumatic.   Eyes:      Extraocular Movements: Extraocular movements intact.      Conjunctiva/sclera: Conjunctivae normal.   Cardiovascular:      Rate and Rhythm: Normal rate.   Pulmonary:      Effort: Pulmonary effort is normal. No respiratory distress.   Skin:     General: Skin is warm and dry.      Findings: Rash present. Rash is urticarial.      Comments: Wheals noted in the cleavage area.  No similar rash on the arms, legs, back.   Neurological:      General: No focal deficit present.      Mental Status: She is alert and oriented to person, place, and time.   Psychiatric:         Mood and Affect: Mood normal.         Behavior: Behavior normal.           "

## 2024-07-03 ENCOUNTER — HOSPITAL ENCOUNTER (EMERGENCY)
Facility: HOSPITAL | Age: 39
Discharge: HOME | End: 2024-07-03
Attending: EMERGENCY MEDICINE
Payer: COMMERCIAL

## 2024-07-03 VITALS
OXYGEN SATURATION: 98 % | SYSTOLIC BLOOD PRESSURE: 124 MMHG | TEMPERATURE: 99.8 F | HEART RATE: 92 BPM | DIASTOLIC BLOOD PRESSURE: 80 MMHG | RESPIRATION RATE: 20 BRPM

## 2024-07-03 DIAGNOSIS — L50.9 URTICARIA: Primary | ICD-10-CM

## 2024-07-03 PROCEDURE — 99284 EMERGENCY DEPT VISIT MOD MDM: CPT | Performed by: EMERGENCY MEDICINE

## 2024-07-03 RX ORDER — PREDNISONE 20 MG/1
40 TABLET ORAL ONCE
Status: COMPLETED | OUTPATIENT
Start: 2024-07-03 | End: 2024-07-03

## 2024-07-03 RX ORDER — PREDNISONE 20 MG/1
TABLET ORAL
Qty: 8 TABLET | Refills: 0 | Status: SHIPPED | OUTPATIENT
Start: 2024-07-03 | End: 2024-07-10

## 2024-07-03 RX ADMIN — PREDNISONE 40 MG: 20 TABLET ORAL at 09:13

## 2024-07-03 NOTE — ED PROVIDER NOTES
History  Chief Complaint   Patient presents with    Rash     Patient reports hives since Thursday, was seen at urgent care yesterday and prescribed medication. Reports feeling as though hives are getting worse.      HPI    39-year-old female with history of hypertension who presents for evaluation of a rash.  Patient has been having a rash for the past week.  She states rash initially started on the legs and then spread to the arms and torso and neck and face.  She was seen in urgent care yesterday.  She has been taking Benadryl and was started on Pepcid.  She states the rash seems to improve at times but then seems to get worse.  She states rash worsened today so she presented to the emergency department.  She states she does have some mild pruritus.  Denies any pain associated with rash.  Denies mucosal lesions.  Denies fevers, chest pain, shortness of breath, or cough.  Denies abdominal pain, vomiting, or diarrhea.  She has not had any new exposures.  Denies any recent changes in medications.  She states she has never had anything like this happen in the past.    Prior to Admission Medications   Prescriptions Last Dose Informant Patient Reported? Taking?   Klor-Con M20 20 MEQ tablet   No No   Sig: TAKE 1 TABLET DAILY   Multiple Vitamin (MULTI-VITAMIN DAILY PO)   Yes No   Sig: Take by mouth   famotidine (PEPCID) 20 mg tablet   No No   Sig: Take 1 tablet (20 mg total) by mouth 2 (two) times a day   lisinopril-hydrochlorothiazide (PRINZIDE,ZESTORETIC) 20-25 MG per tablet   No No   Sig: TAKE 1 TABLET TWICE A DAY   predniSONE 20 mg tablet   No No   Sig: Take 1 tablet (20 mg total) by mouth daily   triamcinolone (KENALOG) 0.025 % cream   No No   Sig: Apply topically 2 (two) times a day      Facility-Administered Medications: None       Past Medical History:   Diagnosis Date    Hypertension     Mastoiditis, acute, right 5/14/2021    Skin cancer (melanoma) (HCC)        Past Surgical History:   Procedure Laterality Date     CHOLECYSTECTOMY  2005    PILONIDAL CYST DRAINAGE      incision and drainage; unknown    SKIN SURGERY         Family History   Problem Relation Age of Onset    Hypertension Father     Coronary artery disease Maternal Grandmother     Alzheimer's disease Paternal Grandmother     Melanoma Paternal Grandfather     No Known Problems Mother     Melanoma Maternal Grandfather      I have reviewed and agree with the history as documented.    E-Cigarette/Vaping    E-Cigarette Use Never User      E-Cigarette/Vaping Substances    Nicotine No     THC No     CBD No     Flavoring No     Other No     Unknown No      Social History     Tobacco Use    Smoking status: Never     Passive exposure: Never    Smokeless tobacco: Never   Vaping Use    Vaping status: Never Used   Substance Use Topics    Alcohol use: No    Drug use: No       Review of Systems   Constitutional:  Negative for appetite change, chills and fever.   HENT:  Negative for congestion, rhinorrhea and sore throat.    Respiratory:  Negative for cough and shortness of breath.    Cardiovascular:  Negative for chest pain.   Gastrointestinal:  Negative for abdominal pain, constipation, diarrhea, nausea and vomiting.   Musculoskeletal:  Negative for arthralgias and myalgias.   Skin:  Positive for rash.   Neurological:  Negative for dizziness, weakness, light-headedness, numbness and headaches.   All other systems reviewed and are negative.      Physical Exam  Physical Exam  Vitals and nursing note reviewed.   Constitutional:       General: She is not in acute distress.     Appearance: Normal appearance. She is well-developed and normal weight. She is not ill-appearing, toxic-appearing or diaphoretic.   HENT:      Head: Normocephalic and atraumatic.      Right Ear: External ear normal.      Left Ear: External ear normal.      Nose: Nose normal.      Mouth/Throat:      Mouth: Mucous membranes are moist.      Pharynx: Oropharynx is clear.   Eyes:      Extraocular Movements:  Extraocular movements intact.      Conjunctiva/sclera: Conjunctivae normal.   Cardiovascular:      Rate and Rhythm: Normal rate and regular rhythm.      Pulses: Normal pulses.      Heart sounds: Normal heart sounds. No murmur heard.     No friction rub. No gallop.   Pulmonary:      Effort: Pulmonary effort is normal. No respiratory distress.      Breath sounds: Normal breath sounds. No wheezing or rales.   Abdominal:      General: There is no distension.      Palpations: Abdomen is soft.      Tenderness: There is no abdominal tenderness. There is no guarding or rebound.   Musculoskeletal:         General: No tenderness.      Cervical back: Neck supple.      Right lower leg: No edema.      Left lower leg: No edema.   Skin:     General: Skin is warm and dry.      Coloration: Skin is not pale.      Findings: Rash present. No erythema.      Comments: Erythematous raised urticarial rash over the bilateral arms and legs and face as well as the torso.  Rash blanches with pressure. Non tender. No lesions on palms or soles.   Neurological:      General: No focal deficit present.      Mental Status: She is alert and oriented to person, place, and time.      Cranial Nerves: No cranial nerve deficit.      Sensory: No sensory deficit.      Motor: No weakness.   Psychiatric:         Mood and Affect: Mood normal.         Behavior: Behavior normal.         Vital Signs  ED Triage Vitals [07/03/24 0843]   Temperature Pulse Respirations Blood Pressure SpO2   99.8 °F (37.7 °C) 93 18 (!) 191/104 95 %      Temp Source Heart Rate Source Patient Position - Orthostatic VS BP Location FiO2 (%)   Temporal Monitor Sitting Left arm --      Pain Score       No Pain           Vitals:    07/03/24 0843 07/03/24 0900   BP: (!) 191/104 124/80   Pulse: 93 92   Patient Position - Orthostatic VS: Sitting Sitting         Visual Acuity      ED Medications  Medications   predniSONE tablet 40 mg (40 mg Oral Given 7/3/24 0913)       Diagnostic  Studies  Results Reviewed       None                   No orders to display              Procedures  Procedures         ED Course                               SBIRT 20yo+      Flowsheet Row Most Recent Value   Initial Alcohol Screen: US AUDIT-C     1. How often do you have a drink containing alcohol? 0 Filed at: 07/03/2024 0843   2. How many drinks containing alcohol do you have on a typical day you are drinking?  0 Filed at: 07/03/2024 0843   3a. Male UNDER 65: How often do you have five or more drinks on one occasion? 0 Filed at: 07/03/2024 0843   3b. FEMALE Any Age, or MALE 65+: How often do you have 4 or more drinks on one occassion? 0 Filed at: 07/03/2024 0843   Audit-C Score 0 Filed at: 07/03/2024 0843   CONNER: How many times in the past year have you...    Used an illegal drug or used a prescription medication for non-medical reasons? Never Filed at: 07/03/2024 0843                      Medical Decision Making  Risk  Prescription drug management.      39-year-old female with history of hypertension who presents for evaluation of a rash for one week, patient has a diffuse urticarial rash, no mucosal lesions.  No fevers or other associated symptoms.  Likely acute urticarial rash secondary to possible environmental exposure versus allergen.  Will treat with prednisone taper.  Will also have patient continue Benadryl and Pepcid.  Patient has appointment with dermatology next week.  Will also have patient follow-up with her primary care doctor.  Discussed with patient strict return precautions.  Patient expressed understanding and was agreeable for discharge.       Disposition  Final diagnoses:   Urticaria     Time reflects when diagnosis was documented in both MDM as applicable and the Disposition within this note       Time User Action Codes Description Comment    7/3/2024  9:06 AM Alice Anthony Add [L50.9] Urticaria           ED Disposition       ED Disposition   Discharge    Condition   Stable    Date/Time    Wed Jul 3, 2024 0906    Comment   Zojaclyn Barone discharge to home/self care.                   Follow-up Information       Follow up With Specialties Details Why Contact Info    Freda Arevalo,  Internal Medicine, Hospice Services Schedule an appointment as soon as possible for a visit   [address] [facility] [address]  [phone]              Discharge Medication List as of 7/3/2024  9:07 AM        START taking these medications    Details   !! predniSONE 20 mg tablet Multiple Dosages:Starting Wed 7/3/2024, Until Wed 7/3/2024 at 2359, THEN Starting Thu 7/4/2024, Until Fri 7/5/2024 at 2359, THEN Starting Sat 7/6/2024, Until Sun 7/7/2024 at 2359, THEN Starting Mon 7/8/2024, Until Tue 7/9/2024 at 2359Take 2 tablets  (40 mg total) by mouth daily for 1 day, THEN 1.5 tablets (30 mg total) daily for 2 days, THEN 1 tablet (20 mg total) daily for 2 days, THEN 0.5 tablets (10 mg total) daily for 2 days., Normal       !! - Potential duplicate medications found. Please discuss with provider.        CONTINUE these medications which have NOT CHANGED    Details   famotidine (PEPCID) 20 mg tablet Take 1 tablet (20 mg total) by mouth 2 (two) times a day, Starting Tue 7/2/2024, Normal      Klor-Con M20 20 MEQ tablet TAKE 1 TABLET DAILY, Normal      lisinopril-hydrochlorothiazide (PRINZIDE,ZESTORETIC) 20-25 MG per tablet TAKE 1 TABLET TWICE A DAY, Normal      Multiple Vitamin (MULTI-VITAMIN DAILY PO) Take by mouth, Historical Med      !! predniSONE 20 mg tablet Take 1 tablet (20 mg total) by mouth daily, Starting Mon 5/6/2024, Normal      triamcinolone (KENALOG) 0.025 % cream Apply topically 2 (two) times a day, Starting Tue 7/2/2024, Normal       !! - Potential duplicate medications found. Please discuss with provider.          No discharge procedures on file.    PDMP Review       None            ED Provider  Electronically Signed by             Alice Anthony MD  07/03/24 7693

## 2024-07-03 NOTE — DISCHARGE INSTRUCTIONS
Please take prednisone daily for 7 days, your dose will taper down over your prescription.     Please follow up with your primary care doctor and dermatology as scheduled.

## 2024-07-08 ENCOUNTER — OFFICE VISIT (OUTPATIENT)
Dept: FAMILY MEDICINE CLINIC | Facility: CLINIC | Age: 39
End: 2024-07-08
Payer: COMMERCIAL

## 2024-07-08 VITALS
OXYGEN SATURATION: 98 % | BODY MASS INDEX: 40.47 KG/M2 | HEART RATE: 84 BPM | RESPIRATION RATE: 18 BRPM | SYSTOLIC BLOOD PRESSURE: 124 MMHG | TEMPERATURE: 97.7 F | WEIGHT: 267 LBS | DIASTOLIC BLOOD PRESSURE: 70 MMHG | HEIGHT: 68 IN

## 2024-07-08 DIAGNOSIS — R21 RASH: Primary | ICD-10-CM

## 2024-07-08 PROCEDURE — 99214 OFFICE O/P EST MOD 30 MIN: CPT | Performed by: INTERNAL MEDICINE

## 2024-07-08 RX ORDER — FEXOFENADINE HCL AND PSEUDOEPHEDRINE HCL 180; 240 MG/1; MG/1
1 TABLET, EXTENDED RELEASE ORAL DAILY
COMMUNITY

## 2024-07-08 NOTE — PROGRESS NOTES
Ambulatory Visit  Name: Zo Barone      : 1985      MRN: [id number]  Encounter Provider: Freda Arevalo DO  Encounter Date: 2024   Encounter department: [facility]    Assessment & Plan   1. Rash  Pt to complete steroid taper and will take Allegra for additional week If sxs resolved no further intervention If recur discussed probable allergy testing   Can stop pepcid after steroid dose done   Stay hydrated   Pay attention to any new lesions or recurrent sxs - pt will call if they recur for further followup      Rto as scheduled  History of Present Illness     HPI  Pt developed acute rash/hives Went to care now and then started steroid taper She did see Derm last week and is on Allegra and pepcid with the steroid taper Today lesions are much less visible and she denies itch No sob or wheeze She has been working outside her home which she is renovating but unsure of cause of acute sxs She thought may be BP med ut reassured her less likely and derm felt the same so she is back on Lisinopril and no recurrent rash as of yet No open areas No drainage No chest pain No prior hx of similar sxs   Review of Systems   Constitutional:  Negative for chills and fever.   HENT: Negative.     Eyes:  Negative for visual disturbance.   Respiratory:  Negative for cough and shortness of breath.    Cardiovascular:  Negative for chest pain, palpitations and leg swelling.   Gastrointestinal:  Negative for abdominal distention and abdominal pain.   Genitourinary: Negative.    Musculoskeletal: Negative.    Skin:  Positive for rash.        Fading rash on extremities    Neurological:  Negative for dizziness, light-headedness and headaches.   Psychiatric/Behavioral:  Negative for sleep disturbance. The patient is not nervous/anxious.      Past Medical History:   Diagnosis Date    Hypertension     Mastoiditis, acute, right 2021    Skin cancer (melanoma) (HCC)      Past Surgical History:   Procedure Laterality  "Date    CHOLECYSTECTOMY  2005    PILONIDAL CYST DRAINAGE      incision and drainage; unknown    SKIN SURGERY       Social History     Socioeconomic History    Marital status: Single     Spouse name: Not on file    Number of children: Not on file    Years of education: Not on file    Highest education level: Not on file   Occupational History    Not on file   Tobacco Use    Smoking status: Never     Passive exposure: Never    Smokeless tobacco: Never   Vaping Use    Vaping status: Never Used   Substance and Sexual Activity    Alcohol use: No    Drug use: No    Sexual activity: Never   Other Topics Concern    Not on file   Social History Narrative    Caffeine use    Dental care, regularly    Good dental hygiene    [employer]    Sun protection sunscreen    Uses safety equipment-seatbelts     Social Determinants of Health     Financial Resource Strain: Not on file   Food Insecurity: Not on file   Transportation Needs: Not on file   Physical Activity: Not on file   Stress: Not on file   Social Connections: Not on file   Intimate Partner Violence: Not on file   Housing Stability: Not on file     Allergies   Allergen Reactions    Amoxicillin     Latex     Other      Adhesive Tape    Sulfa Antibiotics        Objective     /70   Pulse 84   Temp 97.7 °F (36.5 °C) (Temporal)   Resp 18   Ht 5' 8\" (1.727 m)   Wt 121 kg (267 lb)   SpO2 98%   BMI 40.60 kg/m²     Physical Exam  Vitals and nursing note reviewed.   Constitutional:       General: She is not in acute distress.     Appearance: Normal appearance. She is not ill-appearing, toxic-appearing or diaphoretic.   HENT:      Head: Normocephalic and atraumatic.      Right Ear: External ear normal.      Left Ear: External ear normal.      Nose: Nose normal.      Mouth/Throat:      Mouth: Mucous membranes are moist.   Eyes:      General: No scleral icterus.     Extraocular Movements: Extraocular movements intact.      Conjunctiva/sclera: Conjunctivae normal.      " Pupils: Pupils are equal, round, and reactive to light.   Cardiovascular:      Rate and Rhythm: Normal rate and regular rhythm.      Pulses: Normal pulses.      Heart sounds: Normal heart sounds.   Pulmonary:      Effort: Pulmonary effort is normal. No respiratory distress.      Breath sounds: Normal breath sounds. No stridor.   Abdominal:      General: Bowel sounds are normal. There is no distension.      Palpations: Abdomen is soft.      Tenderness: There is no abdominal tenderness.   Musculoskeletal:      Cervical back: Normal range of motion and neck supple.   Lymphadenopathy:      Cervical: No cervical adenopathy.   Skin:     General: Skin is warm and dry.      Coloration: Skin is not jaundiced or pale.   Neurological:      General: No focal deficit present.      Mental Status: She is alert and oriented to person, place, and time. Mental status is at baseline.      Cranial Nerves: No cranial nerve deficit.      Sensory: No sensory deficit.   Psychiatric:         Mood and Affect: Mood normal.         Behavior: Behavior normal.         Thought Content: Thought content normal.         Judgment: Judgment normal.       Administrative Statements

## 2024-07-17 ENCOUNTER — OFFICE VISIT (OUTPATIENT)
Dept: FAMILY MEDICINE CLINIC | Facility: CLINIC | Age: 39
End: 2024-07-17
Payer: COMMERCIAL

## 2024-07-17 ENCOUNTER — HOSPITAL ENCOUNTER (EMERGENCY)
Facility: HOSPITAL | Age: 39
Discharge: HOME | End: 2024-07-17
Attending: EMERGENCY MEDICINE
Payer: COMMERCIAL

## 2024-07-17 VITALS
BODY MASS INDEX: 40.47 KG/M2 | HEIGHT: 68 IN | OXYGEN SATURATION: 95 % | WEIGHT: 267 LBS | RESPIRATION RATE: 18 BRPM | DIASTOLIC BLOOD PRESSURE: 78 MMHG | SYSTOLIC BLOOD PRESSURE: 130 MMHG | HEART RATE: 111 BPM | TEMPERATURE: 98.2 F

## 2024-07-17 VITALS
DIASTOLIC BLOOD PRESSURE: 83 MMHG | RESPIRATION RATE: 18 BRPM | SYSTOLIC BLOOD PRESSURE: 169 MMHG | BODY MASS INDEX: 36.49 KG/M2 | HEART RATE: 104 BPM | WEIGHT: 240 LBS | OXYGEN SATURATION: 93 % | TEMPERATURE: 99 F

## 2024-07-17 DIAGNOSIS — I10 HYPERTENSION, UNSPECIFIED TYPE: ICD-10-CM

## 2024-07-17 DIAGNOSIS — T78.40XD ALLERGIC REACTION, SUBSEQUENT ENCOUNTER: Primary | ICD-10-CM

## 2024-07-17 DIAGNOSIS — R22.0 FACIAL SWELLING: Primary | ICD-10-CM

## 2024-07-17 DIAGNOSIS — T78.3XXA ANGIOEDEMA, INITIAL ENCOUNTER: ICD-10-CM

## 2024-07-17 LAB
ALBUMIN SERPL BCG-MCNC: 4.2 G/DL (ref 3.5–5)
ALP SERPL-CCNC: 66 U/L (ref 34–104)
ALT SERPL W P-5'-P-CCNC: 19 U/L (ref 7–52)
ANION GAP SERPL CALCULATED.3IONS-SCNC: 10 MMOL/L (ref 4–13)
AST SERPL W P-5'-P-CCNC: 15 U/L (ref 13–39)
BASOPHILS # BLD AUTO: 0.04 THOUSANDS/ÂΜL (ref 0–0.1)
BASOPHILS NFR BLD AUTO: 0 % (ref 0–1)
BILIRUB SERPL-MCNC: 0.26 MG/DL (ref 0.2–1)
BUN SERPL-MCNC: 16 MG/DL (ref 5–25)
CALCIUM SERPL-MCNC: 9.5 MG/DL (ref 8.4–10.2)
CHLORIDE SERPL-SCNC: 98 MMOL/L (ref 96–108)
CO2 SERPL-SCNC: 30 MMOL/L (ref 21–32)
CREAT SERPL-MCNC: 0.73 MG/DL (ref 0.6–1.3)
EOSINOPHIL # BLD AUTO: 0.28 THOUSAND/ÂΜL (ref 0–0.61)
EOSINOPHIL NFR BLD AUTO: 3 % (ref 0–6)
ERYTHROCYTE [DISTWIDTH] IN BLOOD BY AUTOMATED COUNT: 14.5 % (ref 11.6–15.1)
GFR SERPL CREATININE-BSD FRML MDRD: 104 ML/MIN/1.73SQ M
GLUCOSE SERPL-MCNC: 116 MG/DL (ref 65–140)
HCT VFR BLD AUTO: 39.8 % (ref 34.8–46.1)
HGB BLD-MCNC: 12.4 G/DL (ref 11.5–15.4)
IMM GRANULOCYTES # BLD AUTO: 0.03 THOUSAND/UL (ref 0–0.2)
IMM GRANULOCYTES NFR BLD AUTO: 0 % (ref 0–2)
LYMPHOCYTES # BLD AUTO: 3.38 THOUSANDS/ÂΜL (ref 0.6–4.47)
LYMPHOCYTES NFR BLD AUTO: 33 % (ref 14–44)
MCH RBC QN AUTO: 24.1 PG (ref 26.8–34.3)
MCHC RBC AUTO-ENTMCNC: 31.2 G/DL (ref 31.4–37.4)
MCV RBC AUTO: 77 FL (ref 82–98)
MONOCYTES # BLD AUTO: 0.56 THOUSAND/ÂΜL (ref 0.17–1.22)
MONOCYTES NFR BLD AUTO: 6 % (ref 4–12)
NEUTROPHILS # BLD AUTO: 5.88 THOUSANDS/ÂΜL (ref 1.85–7.62)
NEUTS SEG NFR BLD AUTO: 58 % (ref 43–75)
NRBC BLD AUTO-RTO: 0 /100 WBCS
PLATELET # BLD AUTO: 342 THOUSANDS/UL (ref 149–390)
PMV BLD AUTO: 9.5 FL (ref 8.9–12.7)
POTASSIUM SERPL-SCNC: 2.9 MMOL/L (ref 3.5–5.3)
PROT SERPL-MCNC: 7.3 G/DL (ref 6.4–8.4)
RBC # BLD AUTO: 5.14 MILLION/UL (ref 3.81–5.12)
SODIUM SERPL-SCNC: 138 MMOL/L (ref 135–147)
WBC # BLD AUTO: 10.17 THOUSAND/UL (ref 4.31–10.16)

## 2024-07-17 PROCEDURE — 85025 COMPLETE CBC W/AUTO DIFF WBC: CPT | Performed by: EMERGENCY MEDICINE

## 2024-07-17 PROCEDURE — 99283 EMERGENCY DEPT VISIT LOW MDM: CPT

## 2024-07-17 PROCEDURE — 99214 OFFICE O/P EST MOD 30 MIN: CPT | Performed by: INTERNAL MEDICINE

## 2024-07-17 PROCEDURE — 99284 EMERGENCY DEPT VISIT MOD MDM: CPT | Performed by: EMERGENCY MEDICINE

## 2024-07-17 PROCEDURE — 96372 THER/PROPH/DIAG INJ SC/IM: CPT

## 2024-07-17 PROCEDURE — 96375 TX/PRO/DX INJ NEW DRUG ADDON: CPT

## 2024-07-17 PROCEDURE — 96374 THER/PROPH/DIAG INJ IV PUSH: CPT

## 2024-07-17 PROCEDURE — 36415 COLL VENOUS BLD VENIPUNCTURE: CPT | Performed by: EMERGENCY MEDICINE

## 2024-07-17 PROCEDURE — 80053 COMPREHEN METABOLIC PANEL: CPT | Performed by: EMERGENCY MEDICINE

## 2024-07-17 RX ORDER — EPINEPHRINE 0.3 MG/.3ML
0.3 INJECTION SUBCUTANEOUS ONCE
Qty: 0.6 ML | Refills: 0 | Status: SHIPPED | OUTPATIENT
Start: 2024-07-17 | End: 2024-07-17

## 2024-07-17 RX ORDER — METHYLPREDNISOLONE SODIUM SUCCINATE 125 MG/2ML
40 INJECTION, POWDER, LYOPHILIZED, FOR SOLUTION INTRAMUSCULAR; INTRAVENOUS ONCE
Status: COMPLETED | OUTPATIENT
Start: 2024-07-17 | End: 2024-07-17

## 2024-07-17 RX ORDER — EPINEPHRINE 1 MG/ML
0.5 INJECTION, SOLUTION, CONCENTRATE INTRAVENOUS ONCE
Status: COMPLETED | OUTPATIENT
Start: 2024-07-17 | End: 2024-07-17

## 2024-07-17 RX ORDER — DIPHENHYDRAMINE HYDROCHLORIDE 50 MG/ML
25 INJECTION INTRAMUSCULAR; INTRAVENOUS ONCE
Status: COMPLETED | OUTPATIENT
Start: 2024-07-17 | End: 2024-07-17

## 2024-07-17 RX ORDER — POTASSIUM CHLORIDE 20 MEQ/1
40 TABLET, EXTENDED RELEASE ORAL ONCE
Status: COMPLETED | OUTPATIENT
Start: 2024-07-17 | End: 2024-07-17

## 2024-07-17 RX ORDER — PREDNISONE 20 MG/1
20 TABLET ORAL DAILY
Qty: 7 TABLET | Refills: 0 | Status: SHIPPED | OUTPATIENT
Start: 2024-07-17

## 2024-07-17 RX ORDER — AMLODIPINE BESYLATE 5 MG/1
5 TABLET ORAL DAILY
Qty: 30 TABLET | Refills: 5 | Status: SHIPPED | OUTPATIENT
Start: 2024-07-17

## 2024-07-17 RX ADMIN — METHYLPREDNISOLONE SODIUM SUCCINATE 40 MG: 125 INJECTION, POWDER, FOR SOLUTION INTRAMUSCULAR; INTRAVENOUS at 01:56

## 2024-07-17 RX ADMIN — EPINEPHRINE 0.5 MG: 1 INJECTION, SOLUTION, CONCENTRATE INTRAVENOUS at 01:55

## 2024-07-17 RX ADMIN — POTASSIUM CHLORIDE 40 MEQ: 1500 TABLET, EXTENDED RELEASE ORAL at 03:31

## 2024-07-17 RX ADMIN — DIPHENHYDRAMINE HYDROCHLORIDE 25 MG: 50 INJECTION, SOLUTION INTRAMUSCULAR; INTRAVENOUS at 04:23

## 2024-07-17 NOTE — PROGRESS NOTES
Ambulatory Visit  Name: Zo Barone      : 1985      MRN: [id number]  Encounter Provider: Freda Arevalo DO  Encounter Date: 2024   Encounter department: [facility]    Assessment & Plan   1. Allergic reaction, subsequent encounter  -     Ambulatory Referral to Allergy; Future  -     predniSONE 20 mg tablet; Take 1 tablet (20 mg total) by mouth daily  Pt will call to schedule allergy followup as likely needs testing  Stop lisinopril hctz and start amlodipine for BP control   Stay hydrated  Benadryl prn Continue allegra   2. Hypertension, unspecified type  -     amLODIPine (NORVASC) 5 mg tablet; Take 1 tablet (5 mg total) by mouth daily  Bp check in 2 weeks  Bmp next as suspect will not need potassium supplement off diuretic        History of Present Illness     Rash  This is a recurrent problem. The current episode started 1 to 4 weeks ago. The problem has been rapidly worsening since onset. The rash is characterized by redness. She was exposed to nothing. Associated symptoms include facial edema. Pertinent negatives include no anorexia, congestion, cough, diarrhea, fatigue, fever, joint pain, rhinorrhea, shortness of breath, sore throat or vomiting.   ER records reviewed Pt awoke with facial swelling and hives She has been off prednisone for few days and last night started once daily allegra She did have penaut bitter last PM but has had in past without issue She said sxs had not fully resolved prior to last pm but had been less noted on bid allegra Sxs are lessening although tongue still feels swollen Given IV steroid in hospital No sob or wheeze Was given potassium in the hospital as well   Review of Systems   Constitutional:  Negative for fatigue and fever.   HENT:  Negative for congestion, rhinorrhea and sore throat.    Eyes:  Negative for pain.   Respiratory:  Negative for cough and shortness of breath.    Gastrointestinal:  Negative for anorexia, diarrhea and vomiting.  "  Musculoskeletal:  Negative for joint pain.   Skin:  Positive for rash.   Had some heartburn and took tums but changed version as thought perhaps that was a trigger   Past Medical History:   Diagnosis Date   • Hypertension    • Mastoiditis, acute, right 5/14/2021   • Skin cancer (melanoma) (HCC)      Past Surgical History:   Procedure Laterality Date   • CHOLECYSTECTOMY  2005   • PILONIDAL CYST DRAINAGE      incision and drainage; unknown   • SKIN SURGERY       Social History     Socioeconomic History   • Marital status: Single     Spouse name: Not on file   • Number of children: Not on file   • Years of education: Not on file   • Highest education level: Not on file   Occupational History   • Not on file   Tobacco Use   • Smoking status: Never     Passive exposure: Never   • Smokeless tobacco: Never   Vaping Use   • Vaping status: Never Used   Substance and Sexual Activity   • Alcohol use: No   • Drug use: No   • Sexual activity: Never   Other Topics Concern   • Not on file   Social History Narrative    Caffeine use    Dental care, regularly    Good dental hygiene    [employer]    Sun protection sunscreen    Uses safety equipment-seatbelts     Social Determinants of Health     Financial Resource Strain: Not on file   Food Insecurity: Not on file   Transportation Needs: Not on file   Physical Activity: Not on file   Stress: Not on file   Social Connections: Not on file   Intimate Partner Violence: Not on file   Housing Stability: Not on file     Allergies   Allergen Reactions   • Amoxicillin    • Latex    • Other      Adhesive Tape   • Sulfa Antibiotics    Labs from ER reviewed and discussed with pt     Objective     Pulse (!) 111   Temp 98.2 °F (36.8 °C) (Temporal)   Ht 5' 8\" (1.727 m)   Wt 121 kg (267 lb)   LMP 06/28/2024   SpO2 95%   BMI 40.60 kg/m²     Physical Exam  Vitals and nursing note reviewed.   Constitutional:       General: She is not in acute distress.     Appearance: Normal appearance. She is " not ill-appearing, toxic-appearing or diaphoretic.   HENT:      Head: Normocephalic and atraumatic.      Right Ear: External ear normal.      Left Ear: External ear normal.      Nose: Nose normal.      Mouth/Throat:      Mouth: Mucous membranes are moist.   Cardiovascular:      Rate and Rhythm: Tachycardia present.      Pulses: Normal pulses.   Pulmonary:      Effort: Pulmonary effort is normal. No respiratory distress.      Breath sounds: Normal breath sounds. No stridor. No wheezing.   Abdominal:      General: There is no distension.      Tenderness: There is no abdominal tenderness.   Musculoskeletal:      Right lower leg: No edema.      Left lower leg: No edema.   Skin:     General: Skin is warm and dry.      Coloration: Skin is not jaundiced or pale.      Findings: Rash present. No bruising or erythema.      Comments: Ading hives on upper arms face cleared and swelling resolved    Neurological:      General: No focal deficit present.      Mental Status: She is alert and oriented to person, place, and time. Mental status is at baseline.      Cranial Nerves: No cranial nerve deficit.   Psychiatric:         Mood and Affect: Mood normal.         Behavior: Behavior normal.         Thought Content: Thought content normal.         Judgment: Judgment normal.   Administrative Statements

## 2024-07-17 NOTE — ED PROVIDER NOTES
History  Chief Complaint   Patient presents with    Facial Swelling     Pt was sleeping woke up from sleeping with swollen lips facial swelling pt been being treated for hives since June pt denies any change in foods      39-year-old female, recently seen for several weeks of an urticarial rash of unknown etiology, who now presents for facial swelling.  Patient reports that she randomly woke up with this before coming to the emergency department.  She describes initially that she had difficulty talking, significant tongue swelling, lip swelling.  She states that she did take a Benadryl that has improved her symptoms slightly.  She denies shortness of breath, no chest pain.  No nausea or vomiting.  She is currently able to communicate with me.  She is not currently on prednisone, she finished her taper.  She incidentally is on lisinopril, has never had a reaction like this in the past.  Review of systems otherwise negative.        Prior to Admission Medications   Prescriptions Last Dose Informant Patient Reported? Taking?   Klor-Con M20 20 MEQ tablet   No No   Sig: TAKE 1 TABLET DAILY   Multiple Vitamin (MULTI-VITAMIN DAILY PO)   Yes No   Sig: Take by mouth   famotidine (PEPCID) 20 mg tablet   No No   Sig: Take 1 tablet (20 mg total) by mouth 2 (two) times a day   fexofenadine-pseudoephedrine (ALLEGRA-D 24) 180-240 MG per 24 hr tablet   Yes No   Sig: Take 1 tablet by mouth daily      Facility-Administered Medications: None       Past Medical History:   Diagnosis Date    Hypertension     Mastoiditis, acute, right 5/14/2021    Skin cancer (melanoma) (HCC)        Past Surgical History:   Procedure Laterality Date    CHOLECYSTECTOMY  2005    PILONIDAL CYST DRAINAGE      incision and drainage; unknown    SKIN SURGERY         Family History   Problem Relation Age of Onset    Hypertension Father     Coronary artery disease Maternal Grandmother     Alzheimer's disease Paternal Grandmother     Melanoma Paternal Grandfather      No Known Problems Mother     Melanoma Maternal Grandfather      I have reviewed and agree with the history as documented.    E-Cigarette/Vaping    E-Cigarette Use Never User      E-Cigarette/Vaping Substances    Nicotine No     THC No     CBD No     Flavoring No     Other No     Unknown No      Social History     Tobacco Use    Smoking status: Never     Passive exposure: Never    Smokeless tobacco: Never   Vaping Use    Vaping status: Never Used   Substance Use Topics    Alcohol use: No    Drug use: No       Review of Systems   Constitutional:  Negative for chills and fever.   HENT:  Positive for facial swelling. Negative for congestion, rhinorrhea and sore throat.    Respiratory:  Negative for cough and shortness of breath.    Cardiovascular:  Negative for chest pain and palpitations.   Gastrointestinal:  Negative for abdominal pain, constipation, diarrhea, nausea and vomiting.   Genitourinary:  Negative for difficulty urinating and flank pain.   Musculoskeletal:  Negative for arthralgias.   Neurological:  Negative for dizziness, weakness, light-headedness and headaches.   Psychiatric/Behavioral:  Negative for agitation, behavioral problems and confusion.    All other systems reviewed and are negative.      Physical Exam  Physical Exam  Constitutional:       Appearance: She is well-developed.   HENT:      Head: Normocephalic and atraumatic.      Mouth/Throat:      Comments: Patient is able to talk, handling secretions well. She has prominent lip swelling, mild tongue swelling.  Cardiovascular:      Rate and Rhythm: Normal rate and regular rhythm.      Heart sounds: Normal heart sounds. No murmur heard.     No friction rub.   Pulmonary:      Effort: Pulmonary effort is normal. No respiratory distress.      Breath sounds: Normal breath sounds. No wheezing or rales.   Abdominal:      General: Bowel sounds are normal. There is no distension.      Palpations: Abdomen is soft.      Tenderness: There is no abdominal  tenderness.   Musculoskeletal:         General: Normal range of motion.      Cervical back: Normal range of motion and neck supple.   Skin:     General: Skin is warm.   Neurological:      Mental Status: She is alert and oriented to person, place, and time.      Coordination: Coordination normal.   Psychiatric:         Behavior: Behavior normal.         Thought Content: Thought content normal.         Judgment: Judgment normal.         Vital Signs  ED Triage Vitals [07/17/24 0135]   Temperature Pulse Respirations Blood Pressure SpO2   99 °F (37.2 °C) 89 18 (!) 201/108 98 %      Temp Source Heart Rate Source Patient Position - Orthostatic VS BP Location FiO2 (%)   Oral Monitor Sitting Right arm --      Pain Score       No Pain           Vitals:    07/17/24 0230 07/17/24 0300 07/17/24 0330 07/17/24 0400   BP: (!) 167/113 170/84 155/87 169/83   Pulse: 89 99 99 104   Patient Position - Orthostatic VS: Lying Sitting Sitting Lying         Visual Acuity      ED Medications  Medications   EPINEPHrine PF (ADRENALIN) 1 mg/mL injection 0.5 mg (0.5 mg Intramuscular Given 7/17/24 0155)   methylPREDNISolone sodium succinate (Solu-MEDROL) injection 40 mg (40 mg Intravenous Given 7/17/24 0156)   potassium chloride (Klor-Con M20) CR tablet 40 mEq (40 mEq Oral Given 7/17/24 0331)   diphenhydrAMINE (BENADRYL) injection 25 mg (25 mg Intravenous Given 7/17/24 0423)       Diagnostic Studies  Results Reviewed       Procedure Component Value Units Date/Time    Comprehensive metabolic panel [[id number]]  (Abnormal) Collected: 07/17/24 0154    Lab Status: Final result Specimen: Blood from Arm, Left Updated: 07/17/24 0226     Sodium 138 mmol/L      Potassium 2.9 mmol/L      Chloride 98 mmol/L      CO2 30 mmol/L      ANION GAP 10 mmol/L      BUN 16 mg/dL      Creatinine 0.73 mg/dL      Glucose 116 mg/dL      Calcium 9.5 mg/dL      AST 15 U/L      ALT 19 U/L      Alkaline Phosphatase 66 U/L      Total Protein 7.3 g/dL      Albumin 4.2 g/dL       Total Bilirubin 0.26 mg/dL      eGFR 104 ml/min/1.73sq m     Narrative:      National Kidney Disease Foundation guidelines for Chronic Kidney Disease (CKD):     Stage 1 with normal or high GFR (GFR > 90 mL/min/1.73 square meters)    Stage 2 Mild CKD (GFR = 60-89 mL/min/1.73 square meters)    Stage 3A Moderate CKD (GFR = 45-59 mL/min/1.73 square meters)    Stage 3B Moderate CKD (GFR = 30-44 mL/min/1.73 square meters)    Stage 4 Severe CKD (GFR = 15-29 mL/min/1.73 square meters)    Stage 5 End Stage CKD (GFR <15 mL/min/1.73 square meters)  Note: GFR calculation is accurate only with a steady state creatinine    CBC and differential [[id number]]  (Abnormal) Collected: 07/17/24 0154    Lab Status: Final result Specimen: Blood from Arm, Left Updated: 07/17/24 0210     WBC 10.17 Thousand/uL      RBC 5.14 Million/uL      Hemoglobin 12.4 g/dL      Hematocrit 39.8 %      MCV 77 fL      MCH 24.1 pg      MCHC 31.2 g/dL      RDW 14.5 %      MPV 9.5 fL      Platelets 342 Thousands/uL      nRBC 0 /100 WBCs      Segmented % 58 %      Immature Grans % 0 %      Lymphocytes % 33 %      Monocytes % 6 %      Eosinophils Relative 3 %      Basophils Relative 0 %      Absolute Neutrophils 5.88 Thousands/µL      Absolute Immature Grans 0.03 Thousand/uL      Absolute Lymphocytes 3.38 Thousands/µL      Absolute Monocytes 0.56 Thousand/µL      Eosinophils Absolute 0.28 Thousand/µL      Basophils Absolute 0.04 Thousands/µL                    No orders to display              Procedures  Procedures         ED Course  ED Course as of 07/17/24 2146 Wed Jul 17, 2024   0215 Eosinophils %: 3  Less consistent with etiology such as DRESS   0227 Potassium(!): 2.9  Hypokalemia, will replete.   0414 Pt is requesting benadryl to help with itching, however, reports substantial improvement in facial swelling, tongue swelling. At this time feels comfortable monitoring at home.                                 SBIRT 22yo+      Flowsheet Row Most Recent Value    Initial Alcohol Screen: US AUDIT-C     1. How often do you have a drink containing alcohol? 0 Filed at: 07/17/2024 0139   2. How many drinks containing alcohol do you have on a typical day you are drinking?  0 Filed at: 07/17/2024 0139   3a. Male UNDER 65: How often do you have five or more drinks on one occasion? 0 Filed at: 07/17/2024 0139   3b. FEMALE Any Age, or MALE 65+: How often do you have 4 or more drinks on one occassion? 0 Filed at: 07/17/2024 0139   Audit-C Score 0 Filed at: 07/17/2024 0139   CONNER: How many times in the past year have you...    Used an illegal drug or used a prescription medication for non-medical reasons? Never Filed at: 07/17/2024 0139                      Medical Decision Making  I reviewed the patient's medical chart, PMHx, prior encounters, medications.    My DDx includes: Allergic reaction, anaphylaxis, ace-inhibitor induced angioedema    Given patient's diffuse rash that is ongoing, now with facial swelling, tongue swelling, will treat as anaphylaxis and observe for 3 hours.    Patient had significant improvement over the course of 3 hours, significant improvement in lip and tongue swelling, patient feels much more comfortable.     At this time, I am recommending that she discontinue lisinopril given possibility of Ace-inhibitor induced angioedema. I will also prescribe an epipen given possibility of anaphylaxis. Recommended she follow up closely with PCP, derm, and allergist regarding next steps in management. She understands and agrees with this.    Discharged with strict return precautions.    Amount and/or Complexity of Data Reviewed  Labs: ordered. Decision-making details documented in ED Course.    Risk  Prescription drug management.                 Disposition  Final diagnoses:   Facial swelling   Angioedema, initial encounter     Time reflects when diagnosis was documented in both MDM as applicable and the Disposition within this note       Time User Action Codes  Description Comment    7/17/2024  4:15 AM Sal Urena [R22.0] Facial swelling     7/17/2024  4:17 AM Sal Urena Add [T78.3XXA] Angioedema, initial encounter           ED Disposition       ED Disposition   Discharge    Condition   Stable    Date/Time   Wed Jul 17, 2024 0415    Comment   Zo Barone discharge to home/self care.                   Follow-up Information       Follow up With Specialties Details Why Contact Info Additional Information    Freda Arevalo,  Internal Medicine, Hospice Services Call  For re-evaluation [address] [facility] [address]  [phone]       [facility] Allergy Call  For re-evaluation [address] [facility] [address] [facility],  [address]  [facility] [address] [phone] [facility], [address] [place] [address] [address], [place] [address]            Discharge Medication List as of 7/17/2024  4:19 AM        START taking these medications    Details   EPINEPHrine (EPIPEN) 0.3 mg/0.3 mL SOAJ Inject 0.3 mL (0.3 mg total) into a muscle once for 1 dose, Starting Wed 7/17/2024, Normal           CONTINUE these medications which have NOT CHANGED    Details   famotidine (PEPCID) 20 mg tablet Take 1 tablet (20 mg total) by mouth 2 (two) times a day, Starting Tue 7/2/2024, Normal      fexofenadine-pseudoephedrine (ALLEGRA-D 24) 180-240 MG per 24 hr tablet Take 1 tablet by mouth daily, Historical Med      Klor-Con M20 20 MEQ tablet TAKE 1 TABLET DAILY, Normal      Multiple Vitamin (MULTI-VITAMIN DAILY PO) Take by mouth, Historical Med      predniSONE 20 mg tablet Take 1 tablet (20 mg total) by mouth daily, Starting Mon 5/6/2024, Normal             No discharge procedures on file.    PDMP Review       None            ED Provider  Electronically Signed by             Sal Urena MD  07/17/24 2830

## 2024-07-17 NOTE — DISCHARGE INSTRUCTIONS
Please follow up with PCP, dermatology, allergy. At this time I am recommending you stop lisinopril given lip swelling, tongue swelling.    Please discuss alternative medications with your PCP.    Please use epipen as prescribed, come to hospital immediately after if this is required.    Thank you.

## 2024-07-18 ENCOUNTER — APPOINTMENT (OUTPATIENT)
Dept: LAB | Facility: CLINIC | Age: 39
End: 2024-07-18
Payer: COMMERCIAL

## 2024-07-18 DIAGNOSIS — L50.9 URTICARIA, UNSPECIFIED: ICD-10-CM

## 2024-07-18 LAB
ANA SER QL IA: NEGATIVE
TSH SERPL DL<=0.05 MIU/L-ACNC: 0.93 UIU/ML (ref 0.45–4.5)

## 2024-07-18 PROCEDURE — 86800 THYROGLOBULIN ANTIBODY: CPT

## 2024-07-18 PROCEDURE — 84432 ASSAY OF THYROGLOBULIN: CPT

## 2024-07-18 PROCEDURE — 36415 COLL VENOUS BLD VENIPUNCTURE: CPT

## 2024-07-18 PROCEDURE — 84443 ASSAY THYROID STIM HORMONE: CPT

## 2024-07-18 PROCEDURE — 83520 IMMUNOASSAY QUANT NOS NONAB: CPT

## 2024-07-18 PROCEDURE — 86038 ANTINUCLEAR ANTIBODIES: CPT

## 2024-07-19 ENCOUNTER — TELEPHONE (OUTPATIENT)
Dept: FAMILY MEDICINE CLINIC | Facility: CLINIC | Age: 39
End: 2024-07-19

## 2024-07-19 ENCOUNTER — TELEPHONE (OUTPATIENT)
Age: 39
End: 2024-07-19

## 2024-07-19 DIAGNOSIS — I10 HYPERTENSION, UNSPECIFIED TYPE: Primary | ICD-10-CM

## 2024-07-19 LAB
THYROGLOB AB SERPL-ACNC: <1 IU/ML (ref 0–0.9)
THYROGLOB SERPL-MCNC: 11.8 NG/ML (ref 1.5–38.5)

## 2024-07-19 RX ORDER — LISINOPRIL 10 MG/1
10 TABLET ORAL DAILY
Qty: 30 TABLET | Refills: 0 | Status: SHIPPED | OUTPATIENT
Start: 2024-07-19

## 2024-07-19 NOTE — TELEPHONE ENCOUNTER
Recent B/P Rx change   B/P running 150/103-109 - a little edema of hand- a little pressure in the head.    This morning before work B/P 159/110    New Rx - amlodipine 5 mg daily - started yesterday    Steroid NEVER started - NOT taking Lisinopril / HCTZ    Taking Allerga Allergy - 1 TID started yesterday at recommendation of allergist    Looking for recommendations from provider

## 2024-07-19 NOTE — TELEPHONE ENCOUNTER
Patient called back. Office left [facility] to call back the office transferred to [facility]

## 2024-07-21 LAB — TRYPTASE SERPL-MCNC: 8.7 UG/L (ref 2.2–13.2)

## 2024-07-29 ENCOUNTER — OFFICE VISIT (OUTPATIENT)
Dept: OBGYN CLINIC | Facility: CLINIC | Age: 39
End: 2024-07-29
Payer: COMMERCIAL

## 2024-07-29 VITALS
WEIGHT: 267 LBS | HEIGHT: 68 IN | SYSTOLIC BLOOD PRESSURE: 118 MMHG | BODY MASS INDEX: 40.47 KG/M2 | OXYGEN SATURATION: 99 % | DIASTOLIC BLOOD PRESSURE: 72 MMHG | HEART RATE: 72 BPM

## 2024-07-29 DIAGNOSIS — Z01.419 WELL FEMALE EXAM WITH ROUTINE GYNECOLOGICAL EXAM: Primary | ICD-10-CM

## 2024-07-29 PROCEDURE — S0610 ANNUAL GYNECOLOGICAL EXAMINA: HCPCS | Performed by: PHYSICIAN ASSISTANT

## 2024-07-29 NOTE — PROGRESS NOTES
Patient here for ROUTINE GYN EXAM.    Patient is 39 y.o. year old female G 0 P 0.  She is here today for her routine gyn exam. She is is virginal.     Menses every 28-30 days, duration x 5 days.  Pt does not smoke.  She denies alcohol/drug abuse.  She denies domestic violence/abuse.  She declines STD/HIV testing.  She denies problems with her breasts, bladder, or bowel.      Pt with no history of abnormal pap smears.  We discussed the current ACOG pap guidelines.  Pt elects pap every 5 years. Last pap was 7/6/2020 and results were negative,  HPV HR negative.      The patients medical, surgical, and family history was reviewed and updated.     Domestic violence screen: negative    She has no complaints today.       Family History breast cancer:  no  Family History ovarian cancer: no  Family History colon cancer: no    Current Outpatient Medications on File Prior to Visit   Medication Sig Dispense Refill    EPINEPHrine (EPIPEN) 0.3 mg/0.3 mL SOAJ Inject 0.3 mL (0.3 mg total) into a muscle once for 1 dose (Patient not taking: Reported on 7/17/2024) 0.6 mL 0    famotidine (PEPCID) 20 mg tablet Take 1 tablet (20 mg total) by mouth 2 (two) times a day 30 tablet 0    fexofenadine-pseudoephedrine (ALLEGRA-D 24) 180-240 MG per 24 hr tablet Take 1 tablet by mouth daily      lisinopril (ZESTRIL) 10 mg tablet Take 1 tablet (10 mg total) by mouth daily (Patient taking differently: Take 20 mg by mouth daily) 30 tablet 0    Multiple Vitamin (MULTI-VITAMIN DAILY PO) Take by mouth      [DISCONTINUED] amLODIPine (NORVASC) 5 mg tablet Take 1 tablet (5 mg total) by mouth daily 30 tablet 5    [DISCONTINUED] Klor-Con M20 20 MEQ tablet TAKE 1 TABLET DAILY 90 tablet 3    [DISCONTINUED] predniSONE 20 mg tablet Take 1 tablet (20 mg total) by mouth daily 7 tablet 0     No current facility-administered medications on file prior to visit.       Allergies   Allergen Reactions    Amoxicillin     Latex     Other      Adhesive Tape    Sulfa  "Antibiotics        Past Surgical History:   Procedure Laterality Date    CHOLECYSTECTOMY  2005    PILONIDAL CYST DRAINAGE      incision and drainage; unknown    SKIN SURGERY             Review of Systems   Constitutional: Negative.    HENT: Negative.    Eyes: Negative.    Respiratory: Negative.    Cardiovascular: Negative.    Gastrointestinal: Negative.    Endocrine: Negative.    Genitourinary:        As noted in HPI   Musculoskeletal: Negative.    Skin: Negative.    Allergic/Immunologic: Negative.    Neurological: Negative.    Hematological: Negative.    Psychiatric/Behavioral: Negative      Vitals:    07/29/24 0804   BP: 118/72   Pulse: 72   SpO2: 99%   Weight: 121 kg (267 lb)   Height: 5' 8\" (1.727 m)           EXAM:    Neck: supple without nodes or thyromegaly  Heart: regular rate and rhythm  Lungs: clear to auscultation without rales, rhonci  Breasts: nontender, no masses, no discoloration, no discharge  Abdomen: soft, nontender, no masses  Ext genitalia: no lesions, no discoloration  Urethra: no discharge or erythema  Bladder: nontender, good support  Vagina: no discharge, no lesions  Cervix: no lesions, no cervical motion tenderness  Adnexa: nontender, no masses  Uterus: nontender, normal size and shape    Assessment:  Routine GYN exam    Plan:  Pap not done today.  Patient is virginal  Track menses  Mammogram will be due at 41    "

## 2024-07-30 ENCOUNTER — APPOINTMENT (OUTPATIENT)
Dept: LAB | Facility: MEDICAL CENTER | Age: 39
End: 2024-07-30
Payer: COMMERCIAL

## 2024-07-30 DIAGNOSIS — E87.6 HYPOKALEMIA: ICD-10-CM

## 2024-07-30 LAB
ANION GAP SERPL CALCULATED.3IONS-SCNC: 7 MMOL/L (ref 4–13)
BUN SERPL-MCNC: 11 MG/DL (ref 5–25)
CALCIUM SERPL-MCNC: 9 MG/DL (ref 8.4–10.2)
CHLORIDE SERPL-SCNC: 102 MMOL/L (ref 96–108)
CO2 SERPL-SCNC: 31 MMOL/L (ref 21–32)
CREAT SERPL-MCNC: 0.68 MG/DL (ref 0.6–1.3)
GFR SERPL CREATININE-BSD FRML MDRD: 111 ML/MIN/1.73SQ M
GLUCOSE P FAST SERPL-MCNC: 93 MG/DL (ref 65–99)
POTASSIUM SERPL-SCNC: 3.2 MMOL/L (ref 3.5–5.3)
SODIUM SERPL-SCNC: 140 MMOL/L (ref 135–147)

## 2024-07-30 PROCEDURE — 36415 COLL VENOUS BLD VENIPUNCTURE: CPT

## 2024-07-30 PROCEDURE — 80048 BASIC METABOLIC PNL TOTAL CA: CPT

## 2024-08-06 DIAGNOSIS — I10 HYPERTENSION, UNSPECIFIED TYPE: ICD-10-CM

## 2024-08-06 RX ORDER — LISINOPRIL 10 MG/1
TABLET ORAL
Qty: 900 TABLET | Refills: 1 | Status: SHIPPED | OUTPATIENT
Start: 2024-08-06

## 2024-09-04 ENCOUNTER — OFFICE VISIT (OUTPATIENT)
Dept: URGENT CARE | Facility: MEDICAL CENTER | Age: 39
End: 2024-09-04
Payer: COMMERCIAL

## 2024-09-04 VITALS
HEIGHT: 68 IN | OXYGEN SATURATION: 97 % | SYSTOLIC BLOOD PRESSURE: 160 MMHG | HEART RATE: 76 BPM | TEMPERATURE: 98.7 F | BODY MASS INDEX: 40.16 KG/M2 | RESPIRATION RATE: 18 BRPM | WEIGHT: 265 LBS | DIASTOLIC BLOOD PRESSURE: 89 MMHG

## 2024-09-04 DIAGNOSIS — J02.9 SORE THROAT: Primary | ICD-10-CM

## 2024-09-04 LAB — S PYO AG THROAT QL: NEGATIVE

## 2024-09-04 PROCEDURE — 87880 STREP A ASSAY W/OPTIC: CPT

## 2024-09-04 PROCEDURE — 99213 OFFICE O/P EST LOW 20 MIN: CPT

## 2024-09-04 NOTE — PROGRESS NOTES
[facility]        NAME: Zo Barone is a 39 y.o. female  : 1985    MRN: [id number]  DATE: 2024  TIME: 10:01 AM    Assessment and Plan   Sore throat [J02.9]  1. Sore throat  POCT rapid ANTIGEN strepA        Sore throat that began on Friday last week.  Very minimal erythema without any swelling, tonsillar exudates, uvular deviation/swelling.  No acute distress.  Rapid strep was negative.  Advised follow-up family doctor.   Advised to go to the ER if any symptoms worsen.  Given advice for at home remedies.    Patient Instructions     Over-the-counter Tylenol for pain or fever.  Warm tea with honey, teaspoon of honey, throat lozenges (cepacol), Chloraseptic spray, warm salt gargles for sore throat.  Make sure to stay well-hydrated.  Follow-up with your family doctor if no improvement.  Follow up with PCP in 3-5 days.  Proceed to  ER if symptoms worsen.    If tests are performed, our office will contact you with results only if changes need to made to the care plan discussed with you at the visit. [facility]    Chief Complaint     Chief Complaint   Patient presents with    Sore Throat     Sore throat that started on friday         History of Present Illness       39-year-old female presents to clinic with sore throat that began on Friday last week.  Has been taking Tylenol since yesterday.  Patient states that a younger relative who started school was ill recently, but denies having strep throat.  Denies any fever, chills, congestion, cough, earache, chest pain, shortness of breath.    Sore Throat         Review of Systems   Review of Systems   Constitutional: Negative.  Negative for chills, diaphoresis, fatigue and fever.   HENT:  Positive for sore throat.    Respiratory: Negative.     Cardiovascular: Negative.    Musculoskeletal: Negative.    Skin: Negative.    Neurological: Negative.          Current Medications       Current Outpatient  "Medications:     famotidine (PEPCID) 20 mg tablet, Take 1 tablet (20 mg total) by mouth 2 (two) times a day, Disp: 30 tablet, Rfl: 0    fexofenadine-pseudoephedrine (ALLEGRA-D 24) 180-240 MG per 24 hr tablet, Take 1 tablet by mouth daily, Disp: , Rfl:     lisinopril (ZESTRIL) 10 mg tablet, Take 3 tablets daily PO, Disp: 900 tablet, Rfl: 1    Multiple Vitamin (MULTI-VITAMIN DAILY PO), Take by mouth, Disp: , Rfl:     EPINEPHrine (EPIPEN) 0.3 mg/0.3 mL SOAJ, Inject 0.3 mL (0.3 mg total) into a muscle once for 1 dose (Patient not taking: Reported on 7/17/2024), Disp: 0.6 mL, Rfl: 0    Current Allergies     Allergies as of 09/04/2024 - Reviewed 09/04/2024   Allergen Reaction Noted    Amoxicillin  04/25/2013    Latex  04/25/2013    Other  04/25/2013    Sulfa antibiotics  04/25/2013            The following portions of the patient's history were reviewed and updated as appropriate: allergies, current medications, past family history, past medical history, past social history, past surgical history and problem list.     Past Medical History:   Diagnosis Date    Hypertension     Mastoiditis, acute, right 5/14/2021    Skin cancer (melanoma) (HCC)        Past Surgical History:   Procedure Laterality Date    CHOLECYSTECTOMY  2005    PILONIDAL CYST DRAINAGE      incision and drainage; unknown    SKIN SURGERY         Family History   Problem Relation Age of Onset    No Known Problems Mother     Hypertension Father     Melanoma Father     Coronary artery disease Maternal Grandmother     Melanoma Maternal Grandfather     Alzheimer's disease Paternal Grandmother     Melanoma Paternal Grandmother     Melanoma Paternal Grandfather          Medications have been verified.        Objective   /89   Pulse 76   Temp 98.7 °F (37.1 °C)   Resp 18   Ht 5' 8\" (1.727 m)   Wt 120 kg (265 lb)   SpO2 97%   BMI 40.29 kg/m²        Physical Exam     Physical Exam  Constitutional:       General: She is not in acute distress.     " Appearance: Normal appearance. She is not ill-appearing or diaphoretic.   HENT:      Head: Normocephalic and atraumatic.      Right Ear: Tympanic membrane, ear canal and external ear normal.      Left Ear: Tympanic membrane, ear canal and external ear normal.      Nose: Nose normal.      Mouth/Throat:      Lips: Pink.      Mouth: Mucous membranes are moist.      Pharynx: Oropharynx is clear. Uvula midline. Posterior oropharyngeal erythema (mild) present. No pharyngeal swelling, oropharyngeal exudate, uvula swelling or postnasal drip.      Tonsils: No tonsillar exudate or tonsillar abscesses.   Eyes:      Extraocular Movements: Extraocular movements intact.      Conjunctiva/sclera: Conjunctivae normal.      Pupils: Pupils are equal, round, and reactive to light.   Cardiovascular:      Rate and Rhythm: Normal rate and regular rhythm.      Pulses: Normal pulses.      Heart sounds: Normal heart sounds.   Pulmonary:      Effort: Pulmonary effort is normal. No respiratory distress.      Breath sounds: Normal breath sounds. No stridor.   Musculoskeletal:      Cervical back: Normal range of motion. No tenderness.   Lymphadenopathy:      Cervical: No cervical adenopathy.   Skin:     General: Skin is warm and dry.      Capillary Refill: Capillary refill takes less than 2 seconds.      Findings: No erythema or rash.   Neurological:      General: No focal deficit present.      Mental Status: She is alert.   Psychiatric:         Mood and Affect: Mood normal.

## 2024-09-04 NOTE — PATIENT INSTRUCTIONS
Over-the-counter Tylenol for pain or fever.  Warm tea with honey, teaspoon of honey, throat lozenges (cepacol), Chloraseptic spray, warm salt gargles for sore throat.  Make sure to stay well-hydrated.  Follow-up with your family doctor if no improvement.  Follow up with PCP in 3-5 days.  Proceed to  ER if symptoms worsen.    If tests are performed, our office will contact you with results only if changes need to made to the care plan discussed with you at the visit. You can review your full results on [facility] Mychart.  Patient Education     Sore throat in adults   The Basics   Written by the doctors and editors at [facility]   What causes sore throat? -- Sore throat is usually caused by an infection. Two types of germs can cause it: viruses and bacteria.  People who have a sore throat caused by a virus do not usually need to see a doctor or nurse. But if you think that you might have been exposed to COVID-19, or if COVID-19 is common where you live, ask your doctor or nurse if you should be tested.  People who have a sore throat caused by bacteria might need to see a doctor or nurse. They might have a type of infection called strep throat. Only about 1 in 10 adults who seek medical care for sore throat have strep throat.  How can I tell if my sore throat is caused by a virus or strep throat? -- It is hard to tell the difference. But there are some clues to look for.  People who have a sore throat caused by a virus usually have other symptoms, such as:   Stuffy or runny nose   Itchy or red eyes   Cough  People who have COVID-19 can have a sore throat as their only symptom. Or they can have other symptoms such as fever, cough, trouble breathing, and problems with their sense of smell or taste. In most cases, the only way to know for sure if you have COVID-19 is to get tested.  People who have strep throat do not usually have a cough, runny nose, or itchy or red eyes. They might have:   Severe throat pain   Fever  "(temperature higher than 100.4°F or 38°C)   Swollen glands in the neck  If you think that you have strep throat, the doctor or nurse can easily check. They can take a sample from the back of your throat and test it for the bacteria that cause strep throat.  Do I need antibiotics? -- If you have an infection caused by a virus, you do not need antibiotics. But if you have strep throat, you should get antibiotics. Most people with strep throat get better without antibiotics, but doctors and nurses often prescribe them. This is because antibiotics often can prevent other problems that might be caused by strep throat. Plus, antibiotics can reduce the symptoms of strep throat and prevent you from spreading it to other people.  What can I do to feel better? -- To relieve the pain of sore throat, you can:   Take over-the-counter pain medicine - Acetaminophen (sample brand name: Tylenol) or ibuprofen (sample brand names: Advil, Motrin) can help with throat pain.   Use sore throat lozenges or sprays - Using medicated sore throat lozenges or throat sprays can temporarily reduce throat pain.   Suck on hard candies, ice chips, or ice pops.   Gargle with salt water - Some people find that this helps with throat pain.   Use a cool mist humidifier - This adds moisture to the air to keep the throat from getting too dry and might help with pain.   Avoid smoking or being around people who are smoking - Smoke can make throat pain worse.  When can I go back to work or school? -- If you have strep throat, wait 1 day after starting antibiotics. By then, you will be a lot less likely to spread the infection to others.  If you have COVID-19, stay home from work or school and \"self-isolate\" until your doctor or nurse tells you it's safe to stop. Self-isolation means staying apart from other people, even the people you live with. When you can stop self-isolation depends on how long it has been since you had symptoms, and in some cases, " whether you have had a negative test (showing that the virus is no longer in your body).  If you have a sore throat that is not due to strep throat or COVID-19, you can go back to your usual activities as soon as you feel well. But it's still important to wash your hands often and cover your mouth if you cough.  When should I call the doctor? -- Call your doctor or nurse if:   You have a fever of at least 101°F (38.4°C).   Your throat pain is severe within the first 2 days, or does not start to improve within 5 to 7 days.   You are having trouble getting enough to eat or drink.   You got antibiotics but still have symptoms after finishing them.  Call for an ambulance (in the [place] and [place], call 9-1-1) or go to the emergency department if you:   Have trouble breathing   Are drooling because you cannot swallow your saliva   Have swelling of the neck or tongue   Cannot move your neck, or have trouble opening your mouth  What can I do to prevent getting a sore throat again? -- Wash your hands often with soap and water (figure 1). It is one of the best ways to prevent the spread of infection.  All topics are updated as new evidence becomes available and our peer review process is complete.  This topic retrieved from [employer] on: Mar 13, 2024.  Topic 85770 Version 23.0  Release: 32.2.4 - C32.71  © 2024 UpToDate, Inc. and/or its affiliates. All rights reserved.  figure 1: How to wash your hands     Wet your hands with clean water, and apply a small amount of soap. Lather and rub hands together for at least 20 seconds. Clean your wrists, palms, backs of your hands, between your fingers, tips of your fingers, thumbs, and under and around your nails. Rinse well, and dry your hands using a clean towel.  Graphic 940293 Version 7.0  Consumer Information Use and Disclaimer   Disclaimer: This generalized information is a limited summary of diagnosis, treatment, and/or medication information. It is not meant to be comprehensive and  should be used as a tool to help the user understand and/or assess potential diagnostic and treatment options. It does NOT include all information about conditions, treatments, medications, side effects, or risks that may apply to a specific patient. It is not intended to be medical advice or a substitute for the medical advice, diagnosis, or treatment of a health care provider based on the health care provider's examination and assessment of a patient's specific and unique circumstances. Patients must speak with a health care provider for complete information about their health, medical questions, and treatment options, including any risks or benefits regarding use of medications. This information does not endorse any treatments or medications as safe, effective, or approved for treating a specific patient. UpToDate, Inc. and its affiliates disclaim any warranty or liability relating to this information or the use thereof.The use of this information is governed by the Terms of Use, available at https://www.[employer].com/en/know/clinical-effectiveness-terms. 2024© UpToDate, Inc. and its affiliates and/or licensors. All rights reserved.  Copyright   © 2024 UpToDate, Inc. and/or its affiliates. All rights reserved.

## 2024-10-01 DIAGNOSIS — I10 HYPERTENSION, UNSPECIFIED TYPE: ICD-10-CM

## 2024-10-01 RX ORDER — LISINOPRIL 10 MG/1
TABLET ORAL
Qty: 270 TABLET | Refills: 3 | Status: SHIPPED | OUTPATIENT
Start: 2024-10-01

## 2024-11-16 ENCOUNTER — APPOINTMENT (OUTPATIENT)
Dept: LAB | Facility: MEDICAL CENTER | Age: 39
End: 2024-11-16
Payer: COMMERCIAL

## 2024-11-16 ENCOUNTER — OFFICE VISIT (OUTPATIENT)
Dept: FAMILY MEDICINE CLINIC | Facility: CLINIC | Age: 39
End: 2024-11-16
Payer: COMMERCIAL

## 2024-11-16 VITALS
RESPIRATION RATE: 18 BRPM | HEART RATE: 88 BPM | HEIGHT: 68 IN | SYSTOLIC BLOOD PRESSURE: 148 MMHG | DIASTOLIC BLOOD PRESSURE: 94 MMHG | TEMPERATURE: 97.2 F | OXYGEN SATURATION: 95 % | WEIGHT: 263.8 LBS | BODY MASS INDEX: 39.98 KG/M2

## 2024-11-16 DIAGNOSIS — I10 HYPERTENSION, UNSPECIFIED TYPE: Primary | ICD-10-CM

## 2024-11-16 DIAGNOSIS — I10 HYPERTENSION, UNSPECIFIED TYPE: ICD-10-CM

## 2024-11-16 PROBLEM — L50.9 URTICARIA: Status: ACTIVE | Noted: 2024-11-16

## 2024-11-16 PROBLEM — Z91.040 ALLERGY TO LATEX: Status: ACTIVE | Noted: 2024-11-16

## 2024-11-16 LAB
ALBUMIN SERPL BCG-MCNC: 4.5 G/DL (ref 3.5–5)
ALP SERPL-CCNC: 59 U/L (ref 34–104)
ALT SERPL W P-5'-P-CCNC: 18 U/L (ref 7–52)
ANION GAP SERPL CALCULATED.3IONS-SCNC: 8 MMOL/L (ref 4–13)
AST SERPL W P-5'-P-CCNC: 19 U/L (ref 13–39)
BILIRUB SERPL-MCNC: 0.44 MG/DL (ref 0.2–1)
BUN SERPL-MCNC: 8 MG/DL (ref 5–25)
CALCIUM SERPL-MCNC: 9.7 MG/DL (ref 8.4–10.2)
CHLORIDE SERPL-SCNC: 99 MMOL/L (ref 96–108)
CO2 SERPL-SCNC: 33 MMOL/L (ref 21–32)
CREAT SERPL-MCNC: 0.71 MG/DL (ref 0.6–1.3)
ERYTHROCYTE [DISTWIDTH] IN BLOOD BY AUTOMATED COUNT: 15 % (ref 11.6–15.1)
GFR SERPL CREATININE-BSD FRML MDRD: 108 ML/MIN/1.73SQ M
GLUCOSE SERPL-MCNC: 101 MG/DL (ref 65–140)
HCT VFR BLD AUTO: 39.2 % (ref 34.8–46.1)
HGB BLD-MCNC: 12.1 G/DL (ref 11.5–15.4)
MCH RBC QN AUTO: 22.7 PG (ref 26.8–34.3)
MCHC RBC AUTO-ENTMCNC: 30.9 G/DL (ref 31.4–37.4)
MCV RBC AUTO: 74 FL (ref 82–98)
PLATELET # BLD AUTO: 505 THOUSANDS/UL (ref 149–390)
PMV BLD AUTO: 10.1 FL (ref 8.9–12.7)
POTASSIUM SERPL-SCNC: 3.5 MMOL/L (ref 3.5–5.3)
PROT SERPL-MCNC: 7.8 G/DL (ref 6.4–8.4)
RBC # BLD AUTO: 5.33 MILLION/UL (ref 3.81–5.12)
SODIUM SERPL-SCNC: 140 MMOL/L (ref 135–147)
WBC # BLD AUTO: 8.17 THOUSAND/UL (ref 4.31–10.16)

## 2024-11-16 PROCEDURE — 36415 COLL VENOUS BLD VENIPUNCTURE: CPT

## 2024-11-16 PROCEDURE — 80053 COMPREHEN METABOLIC PANEL: CPT

## 2024-11-16 PROCEDURE — 85027 COMPLETE CBC AUTOMATED: CPT

## 2024-11-16 PROCEDURE — 99214 OFFICE O/P EST MOD 30 MIN: CPT | Performed by: INTERNAL MEDICINE

## 2024-11-16 RX ORDER — LISINOPRIL 40 MG/1
40 TABLET ORAL DAILY
Qty: 90 TABLET | Refills: 1 | Status: SHIPPED | OUTPATIENT
Start: 2024-11-16

## 2024-11-16 RX ORDER — CARVEDILOL 3.12 MG/1
3.12 TABLET ORAL 2 TIMES DAILY WITH MEALS
Qty: 60 TABLET | Refills: 1 | Status: SHIPPED | OUTPATIENT
Start: 2024-11-16

## 2024-11-16 NOTE — PROGRESS NOTES
Name: Zo Barone      : 1985      MRN: [id number]  Encounter Provider: Freda Arevalo DO  Encounter Date: 2024   Encounter department: [facility]  :  Assessment & Plan  Hypertension, unspecified type    Orders:  •  CBC and Platelet; Future  •  Comprehensive metabolic panel; Future  •  lisinopril (ZESTRIL) 40 mg tablet; Take 1 tablet (40 mg total) by mouth daily  •  carvedilol (Coreg) 3.125 mg tablet; Take 1 tablet (3.125 mg total) by mouth 2 (two) times a day with meals  Low sodium diet Start coreg and dc Lisinopril hct Bp check in 2 weeks at office   Exercise encouraged   Monitor BP at home at least once daily varying times         Depression Screening and Follow-up Plan: Patient was screened for depression during today's encounter. They screened negative with a PHQ-2 score of 0.    History of Present Illness     HPI  Pt Bp remains elevated She is taking 40mg lisinopril and still has higher than normal numbers No chest pain She has a cold and had some sinus area headache but that has not been consistent She stil has hives but not as bad and remains on allegra tid and follows with allergist Sxs usually occur at night and are less overwhelming than they were but no worse back on Lisinopril   Review of Systems   Constitutional:  Negative for chills and fever.   HENT: Negative.     Eyes:  Negative for visual disturbance.   Respiratory:  Negative for cough and shortness of breath.    Cardiovascular: Negative.    Gastrointestinal: Negative.    Genitourinary: Negative.    Musculoskeletal: Negative.    Neurological: Negative.    Hematological: Negative.    Psychiatric/Behavioral:  Negative for sleep disturbance. The patient is not nervous/anxious.    Past Medical History:   Diagnosis Date   • Hypertension    • Mastoiditis, acute, right 2021   • Skin cancer (melanoma) (HCC)      Past Surgical History:   Procedure Laterality Date   • CHOLECYSTECTOMY     • PILONIDAL CYST DRAINAGE       "incision and drainage; unknown   • SKIN SURGERY       Social History     Socioeconomic History   • Marital status: Single     Spouse name: Not on file   • Number of children: Not on file   • Years of education: Not on file   • Highest education level: Not on file   Occupational History   • Not on file   Tobacco Use   • Smoking status: Never     Passive exposure: Never   • Smokeless tobacco: Never   Vaping Use   • Vaping status: Never Used   Substance and Sexual Activity   • Alcohol use: No   • Drug use: No   • Sexual activity: Never   Other Topics Concern   • Not on file   Social History Narrative    Caffeine use    Dental care, regularly    Good dental hygiene    [place]    Sun protection sunscreen    Uses safety equipment-seatbelts     Social Drivers of Health     Financial Resource Strain: Not on file   Food Insecurity: Not on file   Transportation Needs: Not on file   Physical Activity: Not on file   Stress: Not on file   Social Connections: Not on file   Intimate Partner Violence: Not on file   Housing Stability: Not on file     Allergies   Allergen Reactions   • Amoxicillin    • Latex    • Other      Adhesive Tape   • Sulfa Antibiotics           Objective   /94   Pulse 88   Temp (!) 97.2 °F (36.2 °C) (Temporal)   Resp 18   Ht 5' 8\" (1.727 m)   Wt 120 kg (263 lb 12.8 oz)   SpO2 95%   BMI 40.11 kg/m²      Physical Exam  Vitals and nursing note reviewed.   Constitutional:       General: She is not in acute distress.     Appearance: Normal appearance. She is not ill-appearing, toxic-appearing or diaphoretic.   HENT:      Head: Normocephalic and atraumatic.      Right Ear: External ear normal.      Left Ear: External ear normal.      Nose: Nose normal.      Mouth/Throat:      Mouth: Mucous membranes are moist.   Eyes:      General: No scleral icterus.     Extraocular Movements: Extraocular movements intact.      Conjunctiva/sclera: Conjunctivae normal.      Pupils: Pupils are equal, round, and " reactive to light.   Cardiovascular:      Rate and Rhythm: Normal rate and regular rhythm.      Pulses: Normal pulses.      Heart sounds: Normal heart sounds. No murmur heard.  Pulmonary:      Effort: Pulmonary effort is normal.      Breath sounds: Normal breath sounds.   Abdominal:      General: Bowel sounds are normal. There is no distension.      Palpations: Abdomen is soft.      Tenderness: There is no abdominal tenderness.   Musculoskeletal:      Cervical back: Normal range of motion and neck supple.      Right lower leg: No edema.      Left lower leg: No edema.   Lymphadenopathy:      Cervical: No cervical adenopathy.   Skin:     General: Skin is warm and dry.      Coloration: Skin is not jaundiced or pale.   Neurological:      General: No focal deficit present.      Mental Status: She is alert and oriented to person, place, and time. Mental status is at baseline.      Cranial Nerves: No cranial nerve deficit.   Psychiatric:         Mood and Affect: Mood normal.         Behavior: Behavior normal.         Thought Content: Thought content normal.         Judgment: Judgment normal.

## 2024-11-17 ENCOUNTER — RESULTS FOLLOW-UP (OUTPATIENT)
Dept: FAMILY MEDICINE CLINIC | Facility: CLINIC | Age: 39
End: 2024-11-17

## 2024-11-17 DIAGNOSIS — R71.8 LOW MEAN CORPUSCULAR VOLUME (MCV): Primary | ICD-10-CM

## 2024-11-20 ENCOUNTER — RESULTS FOLLOW-UP (OUTPATIENT)
Dept: FAMILY MEDICINE CLINIC | Facility: CLINIC | Age: 39
End: 2024-11-20

## 2024-11-20 ENCOUNTER — APPOINTMENT (OUTPATIENT)
Dept: LAB | Facility: CLINIC | Age: 39
End: 2024-11-20
Payer: COMMERCIAL

## 2024-11-20 DIAGNOSIS — R71.8 LOW MEAN CORPUSCULAR VOLUME (MCV): ICD-10-CM

## 2024-11-20 DIAGNOSIS — E61.1 IRON DEFICIENCY: Primary | ICD-10-CM

## 2024-11-20 LAB
FERRITIN SERPL-MCNC: 4 NG/ML (ref 11–307)
IRON SATN MFR SERPL: 6 % (ref 15–50)
IRON SERPL-MCNC: 22 UG/DL (ref 50–212)
TIBC SERPL-MCNC: 395 UG/DL (ref 250–450)
UIBC SERPL-MCNC: 373 UG/DL (ref 155–355)

## 2024-11-20 PROCEDURE — 82728 ASSAY OF FERRITIN: CPT

## 2024-11-20 PROCEDURE — 36415 COLL VENOUS BLD VENIPUNCTURE: CPT

## 2024-11-20 PROCEDURE — 83540 ASSAY OF IRON: CPT

## 2024-11-20 PROCEDURE — 83550 IRON BINDING TEST: CPT

## 2024-11-20 RX ORDER — FERROUS SULFATE 324(65)MG
324 TABLET, DELAYED RELEASE (ENTERIC COATED) ORAL
Qty: 60 TABLET | Refills: 0 | Status: SHIPPED | OUTPATIENT
Start: 2024-11-20

## 2024-11-25 DIAGNOSIS — D50.9 IRON DEFICIENCY ANEMIA, UNSPECIFIED IRON DEFICIENCY ANEMIA TYPE: Primary | ICD-10-CM

## 2024-12-09 ENCOUNTER — TELEPHONE (OUTPATIENT)
Dept: FAMILY MEDICINE CLINIC | Facility: CLINIC | Age: 39
End: 2024-12-09

## 2024-12-09 DIAGNOSIS — I10 BENIGN ESSENTIAL HYPERTENSION: Primary | ICD-10-CM

## 2024-12-09 RX ORDER — CARVEDILOL 6.25 MG/1
6.25 TABLET ORAL 2 TIMES DAILY WITH MEALS
Qty: 60 TABLET | Refills: 5 | Status: SHIPPED | OUTPATIENT
Start: 2024-12-09

## 2024-12-09 RX ORDER — CARVEDILOL 6.25 MG/1
6.25 TABLET ORAL 2 TIMES DAILY WITH MEALS
COMMUNITY
Start: 2024-12-09 | End: 2024-12-09 | Stop reason: SDUPTHER

## 2024-12-09 NOTE — TELEPHONE ENCOUNTER
Pt came into office for BP check. BP reading on L arm while sitting was 170/110, and 166/100 on the R arm. Please advise.

## 2024-12-09 NOTE — TELEPHONE ENCOUNTER
Would recommend increase coreg to 6.25mg BID - she can take 2 tablets BID of what she has but let me know if needs new rx  Bp check 1 week on new dose

## 2024-12-16 ENCOUNTER — TELEPHONE (OUTPATIENT)
Dept: FAMILY MEDICINE CLINIC | Facility: CLINIC | Age: 39
End: 2024-12-16

## 2024-12-16 DIAGNOSIS — I1A.0 RESISTANT HYPERTENSION: Primary | ICD-10-CM

## 2024-12-16 DIAGNOSIS — I10 HYPERTENSION, UNSPECIFIED TYPE: Primary | ICD-10-CM

## 2024-12-16 RX ORDER — SPIRONOLACTONE 25 MG/1
25 TABLET ORAL DAILY
Qty: 30 TABLET | Refills: 5 | Status: SHIPPED | OUTPATIENT
Start: 2024-12-16

## 2024-12-16 NOTE — TELEPHONE ENCOUNTER
Pt agreeable to see Cardiology and has upcoming appt on 1/23/25. Pt also tolerated water pill, but it did lower her potassium at that time. Do you want pt to continue the Coreg 6.25 twice a day and lisinopril 40mg. Should pt also start potassium or hold off on that? Please advise.

## 2024-12-16 NOTE — TELEPHONE ENCOUNTER
Pt came into office this morning for BP check. Medication changes were made last week. Pt's blood pressure sitting down on L arm was 190/118 manually. Checked on R arm as well it was 178/114 sitting position and manually. Pt states she is getting frequent headaches but no other sxs. Please advise.

## 2024-12-16 NOTE — TELEPHONE ENCOUNTER
I placed cardiology referral  Check with patient if she can tolerate water pill I would restart diuretic - lasix 20 mg would be the choice

## 2024-12-16 NOTE — TELEPHONE ENCOUNTER
Patient returned call I relayed the message:      I placed cardiology referral  Check with patient if she can tolerate water pill I would restart diuretic - lasix 20 mg would be the choice turned call. I relayed message:    Patient had additional questions. Warm transferred to [facility].

## 2024-12-16 NOTE — TELEPHONE ENCOUNTER
I sent aldactone 25 mg - take daily would check electrolytes in 2 weeks - it does not lower potassium but may increase it at times

## 2025-01-02 DIAGNOSIS — I10 HYPERTENSION, UNSPECIFIED TYPE: Primary | ICD-10-CM

## 2025-01-02 RX ORDER — CARVEDILOL 12.5 MG/1
12.5 TABLET ORAL 2 TIMES DAILY WITH MEALS
Qty: 60 TABLET | Refills: 5 | Status: SHIPPED | OUTPATIENT
Start: 2025-01-02

## 2025-01-23 ENCOUNTER — OFFICE VISIT (OUTPATIENT)
Dept: CARDIOLOGY CLINIC | Facility: HOSPITAL | Age: 40
End: 2025-01-23
Payer: COMMERCIAL

## 2025-01-23 ENCOUNTER — APPOINTMENT (OUTPATIENT)
Dept: LAB | Facility: HOSPITAL | Age: 40
End: 2025-01-23
Payer: COMMERCIAL

## 2025-01-23 ENCOUNTER — RESULTS FOLLOW-UP (OUTPATIENT)
Dept: FAMILY MEDICINE CLINIC | Facility: CLINIC | Age: 40
End: 2025-01-23

## 2025-01-23 VITALS
SYSTOLIC BLOOD PRESSURE: 160 MMHG | HEART RATE: 71 BPM | WEIGHT: 253 LBS | DIASTOLIC BLOOD PRESSURE: 98 MMHG | BODY MASS INDEX: 38.34 KG/M2 | HEIGHT: 68 IN | OXYGEN SATURATION: 99 %

## 2025-01-23 DIAGNOSIS — I1A.0 RESISTANT HYPERTENSION: ICD-10-CM

## 2025-01-23 DIAGNOSIS — D50.9 IRON DEFICIENCY ANEMIA, UNSPECIFIED IRON DEFICIENCY ANEMIA TYPE: ICD-10-CM

## 2025-01-23 DIAGNOSIS — I10 BENIGN ESSENTIAL HYPERTENSION: ICD-10-CM

## 2025-01-23 DIAGNOSIS — E61.1 IRON DEFICIENCY: ICD-10-CM

## 2025-01-23 DIAGNOSIS — E78.5 DYSLIPIDEMIA: ICD-10-CM

## 2025-01-23 DIAGNOSIS — E66.9 OBESITY (BMI 30-39.9): ICD-10-CM

## 2025-01-23 LAB
ANION GAP SERPL CALCULATED.3IONS-SCNC: 9 MMOL/L (ref 4–13)
BUN SERPL-MCNC: 6 MG/DL (ref 5–25)
CALCIUM SERPL-MCNC: 9.2 MG/DL (ref 8.4–10.2)
CHLORIDE SERPL-SCNC: 103 MMOL/L (ref 96–108)
CO2 SERPL-SCNC: 28 MMOL/L (ref 21–32)
CREAT SERPL-MCNC: 0.68 MG/DL (ref 0.6–1.3)
ERYTHROCYTE [DISTWIDTH] IN BLOOD BY AUTOMATED COUNT: 15.9 % (ref 11.6–15.1)
FERRITIN SERPL-MCNC: 6 NG/ML (ref 11–307)
GFR SERPL CREATININE-BSD FRML MDRD: 111 ML/MIN/1.73SQ M
GLUCOSE SERPL-MCNC: 99 MG/DL (ref 65–140)
HCT VFR BLD AUTO: 38.7 % (ref 34.8–46.1)
HGB BLD-MCNC: 11.9 G/DL (ref 11.5–15.4)
IRON SATN MFR SERPL: 9 % (ref 15–50)
IRON SERPL-MCNC: 39 UG/DL (ref 50–212)
MCH RBC QN AUTO: 23.2 PG (ref 26.8–34.3)
MCHC RBC AUTO-ENTMCNC: 30.7 G/DL (ref 31.4–37.4)
MCV RBC AUTO: 76 FL (ref 82–98)
PLATELET # BLD AUTO: 285 THOUSANDS/UL (ref 149–390)
PMV BLD AUTO: 9.6 FL (ref 8.9–12.7)
POTASSIUM SERPL-SCNC: 3.7 MMOL/L (ref 3.5–5.3)
RBC # BLD AUTO: 5.12 MILLION/UL (ref 3.81–5.12)
SODIUM SERPL-SCNC: 140 MMOL/L (ref 135–147)
TIBC SERPL-MCNC: 442.4 UG/DL (ref 250–450)
TRANSFERRIN SERPL-MCNC: 316 MG/DL (ref 203–362)
UIBC SERPL-MCNC: 403 UG/DL (ref 155–355)
WBC # BLD AUTO: 5.06 THOUSAND/UL (ref 4.31–10.16)

## 2025-01-23 PROCEDURE — 85027 COMPLETE CBC AUTOMATED: CPT

## 2025-01-23 PROCEDURE — 82728 ASSAY OF FERRITIN: CPT

## 2025-01-23 PROCEDURE — 99214 OFFICE O/P EST MOD 30 MIN: CPT | Performed by: INTERNAL MEDICINE

## 2025-01-23 PROCEDURE — 36415 COLL VENOUS BLD VENIPUNCTURE: CPT

## 2025-01-23 PROCEDURE — 83550 IRON BINDING TEST: CPT

## 2025-01-23 PROCEDURE — 83540 ASSAY OF IRON: CPT

## 2025-01-23 PROCEDURE — 80048 BASIC METABOLIC PNL TOTAL CA: CPT

## 2025-01-23 RX ORDER — CARVEDILOL 25 MG/1
25 TABLET ORAL 2 TIMES DAILY WITH MEALS
Qty: 90 TABLET | Refills: 3 | Status: SHIPPED | OUTPATIENT
Start: 2025-01-23

## 2025-01-23 RX ORDER — NIFEDIPINE 30 MG/1
30 TABLET, EXTENDED RELEASE ORAL DAILY
Qty: 90 TABLET | Refills: 3 | Status: SHIPPED | OUTPATIENT
Start: 2025-01-23

## 2025-01-23 NOTE — PROGRESS NOTES
Cardiology Consultation     Zo Barone  [id number]  1985  [facility] [address] [address]      1. Resistant hypertension  Ambulatory Referral to Cardiology      2. Benign essential hypertension  Basic metabolic panel    VAS renal artery complete    Echo complete w/ contrast if indicated    Ambulatory Referral to Sleep Medicine    carvedilol (COREG) 25 mg tablet    NIFEdipine (PROCARDIA XL) 30 mg 24 hr tablet      3. Dyslipidemia        4. Obesity (BMI 30-39.9)            Discussion/Summary:  Ms. Barone is a very pleasant 39-year-old female who presents to the office today for the evaluation of hypertension.    She has been maintained on a three drug regimen with suboptimal control.  I have asked that she undergo further evaluation with a renal artery duplex to rule out fibromuscular dysplasia/renal artery stenosis.  I have asked for an echocardiogram given her longstanding hypertension.  She does have signs and symptoms of obstructive sleep apnea.  She is agreeable to a sleep study.  I will increase her carvedilol to 25 mg twice daily.  She is agreeable to reattempting nifedipine.  She will notify the office of any recurrent swelling.  A low-salt diet was reinforced.  She has not had her potassium reassessed since the introduction of spironolactone and she will have labs done today.  She recently began a regular exercise program as well.    Otherwise she does have a blood pressure cuff with which she monitors her blood pressure at home.  I have asked that she bring it with her to her next visit to ensure it is providing accurate readings.    I will see her back in the office in a few months for re-evaluation.    History of Present Illness: Ms. Barone is a very pleasant 39-year-old female who presents to the office today for evaluation of hypertension.  She states she was  diagnosed with high blood pressure probably in her 20s.  She was on lisinopril/hydrochlorothiazide.  She developed a rash.   The medication was stopped and then eventually resumed but hydrochlorothiazide was subsequently discontinued in the setting of hypokalemia.  Spironolactone and carvedilol were added.  She reports she was on amlodipine in the past but this caused hand and feet swelling.    She recently started exercising again.  She lifts weights maybe three times per week.  She also does a walking video and utilizes a walking pad.  In doing so she feels well.  She denies any exertional chest pain or shortness of breath.  She denies any signs or symptoms of congestive heart failure including lower extremity edema, paroxysmal nocturnal dyspnea, orthopnea, acute weight gain or increasing abdominal girth.  She denies lightheadedness, syncope or presyncope.  She denies palpitations or symptoms of claudication.    She has been told she snores at night.  She is unsure if there are any witnessed apneas.  She does occasionally wake with headaches more consistent with her migraines.  Sometimes during the day she will get a frontal headache described as pressure.  She does not nap during the day but does become fatigued early in the evening.  She denies any difficulty concentrating.    She denies any family history of premature CAD or sudden cardiac death.  Her mother and father have a history of hypertension.    She is a lifelong non-smoker.  She has had no pregnancies.    Patient Active Problem List   Diagnosis    Hyperlipidemia    Benign essential hypertension    Annual physical exam    BMI 37.0-37.9, adult    Neuralgia    History of hypokalemia    Chondromalacia patellae    Rash    Allergy to latex    Urticaria     Past Medical History:   Diagnosis Date    Hypertension     Mastoiditis, acute, right 5/14/2021    Skin cancer (melanoma) ([facility])      Social History     Socioeconomic History    Marital status: Single      Spouse name: Not on file    Number of children: Not on file    Years of education: Not on file    Highest education level: Not on file   Occupational History    Not on file   Tobacco Use    Smoking status: Never     Passive exposure: Never    Smokeless tobacco: Never   Vaping Use    Vaping status: Never Used   Substance and Sexual Activity    Alcohol use: No    Drug use: No    Sexual activity: Never   Other Topics Concern    Not on file   Social History Narrative    Caffeine use    Dental care, regularly    Good dental hygiene    T[employer]    Sun protection sunscreen    Uses safety equipment-seatbelts     Social Drivers of Health     Financial Resource Strain: Not on file   Food Insecurity: Not on file   Transportation Needs: Not on file   Physical Activity: Not on file   Stress: Not on file   Social Connections: Not on file   Intimate Partner Violence: Not on file   Housing Stability: Not on file      Family History   Problem Relation Age of Onset    Hypertension Mother     Hypertension Father     Melanoma Father     Coronary artery disease Maternal Grandmother     Melanoma Maternal Grandfather     Alzheimer's disease Paternal Grandmother     Melanoma Paternal Grandmother     Melanoma Paternal Grandfather      Past Surgical History:   Procedure Laterality Date    CHOLECYSTECTOMY  2005    PILONIDAL CYST DRAINAGE      incision and drainage; unknown    SKIN SURGERY         Current Outpatient Medications:     carvedilol (COREG) 25 mg tablet, Take 1 tablet (25 mg total) by mouth 2 (two) times a day with meals, Disp: 90 tablet, Rfl: 3    EPINEPHrine (EPIPEN) 0.3 mg/0.3 mL SOAJ, Inject 0.3 mL (0.3 mg total) into a muscle once for 1 dose, Disp: 0.6 mL, Rfl: 0    ferrous sulfate 324 (65 Fe) mg, Take 1 tablet (324 mg total) by mouth 2 (two) times a day before meals, Disp: 60 tablet, Rfl: 0    fexofenadine-pseudoephedrine (ALLEGRA-D 24) 180-240 MG per 24 hr tablet, Take 1 tablet by mouth daily, Disp: , Rfl:     lisinopril  "(ZESTRIL) 40 mg tablet, Take 1 tablet (40 mg total) by mouth daily, Disp: 90 tablet, Rfl: 1    Multiple Vitamin (MULTI-VITAMIN DAILY PO), Take by mouth, Disp: , Rfl:     NIFEdipine (PROCARDIA XL) 30 mg 24 hr tablet, Take 1 tablet (30 mg total) by mouth daily, Disp: 90 tablet, Rfl: 3    spironolactone (ALDACTONE) 25 mg tablet, Take 1 tablet (25 mg total) by mouth daily, Disp: 30 tablet, Rfl: 5  Allergies   Allergen Reactions    Amoxicillin     Latex     Other      Adhesive Tape    Sulfa Antibiotics        Imaging: No results found.    ECG: Normal sinus rhythm, normal ECG    Review of Systems:  Review of Systems      Vitals:    01/23/25 1321 01/23/25 1402   BP: (!) 170/110 160/98   Pulse: 71    SpO2: 99%    Weight: 115 kg (253 lb)    Height: 5' 8\" (1.727 m)      Vitals:    01/23/25 1321   Weight: 115 kg (253 lb)     Height: 5' 8\" (172.7 cm)     Physical Exam:  General appearance:  Appears stated age, alert, well appearing and in no distress  HEENT:  PERRLA, EOMI, no scleral icterus, no conjunctival pallor  NECK:  Supple, No elevated JVP, no thyromegaly, no carotid bruits  HEART:  Regular rate and rhythm, normal S1/S2, no S3/S4, no murmur or rub  LUNGS:  Clear to auscultation bilaterally  ABDOMEN:  Soft, non-tender, positive bowel sounds, no rebound or guarding, no organomegaly   EXTREMITIES:  No edema  VASCULAR:  Normal pedal pulses   SKIN: No lesions or rashes on exposed skin  NEURO:  CN II-XII intact, no focal deficits  "

## 2025-01-24 ENCOUNTER — TRANSCRIBE ORDERS (OUTPATIENT)
Dept: SLEEP CENTER | Facility: CLINIC | Age: 40
End: 2025-01-24

## 2025-01-24 DIAGNOSIS — E61.1 IRON DEFICIENCY: Primary | ICD-10-CM

## 2025-01-24 DIAGNOSIS — I10 BENIGN ESSENTIAL HYPERTENSION: Primary | ICD-10-CM

## 2025-01-24 RX ORDER — FERROUS SULFATE 324(65)MG
324 TABLET, DELAYED RELEASE (ENTERIC COATED) ORAL
Qty: 180 TABLET | Refills: 1 | Status: SHIPPED | OUTPATIENT
Start: 2025-01-24

## 2025-01-31 ENCOUNTER — HOSPITAL ENCOUNTER (OUTPATIENT)
Dept: SLEEP CENTER | Facility: HOSPITAL | Age: 40
Discharge: HOME | End: 2025-01-31
Payer: COMMERCIAL

## 2025-01-31 DIAGNOSIS — I10 BENIGN ESSENTIAL HYPERTENSION: ICD-10-CM

## 2025-01-31 PROCEDURE — G0399 HOME SLEEP TEST/TYPE 3 PORTA: HCPCS

## 2025-01-31 NOTE — PROGRESS NOTES
Home Sleep Study Documentation    HOME STUDY DEVICE: Noxturnal no                                           Padmini G3 yes device # 23      Pre-Sleep Home Study:    Set-up and instructions performed by: Maria Alejandra    Technician performed demonstration for Patient: yes    Return demonstration performed by Patient: yes    Written instructions provided to Patient: yes    Patient signed consent form: yes        Post-Sleep Home Study:    Additional comments by Patient: None    Home Sleep Study Failed:no:    Failure reason: N/A    Reported or Detected: N/A    Scored by: JARRETT Crum

## 2025-02-04 PROBLEM — G47.33 OSA (OBSTRUCTIVE SLEEP APNEA): Status: ACTIVE | Noted: 2025-02-04

## 2025-02-05 ENCOUNTER — HOSPITAL ENCOUNTER (OUTPATIENT)
Dept: NON INVASIVE DIAGNOSTICS | Facility: HOSPITAL | Age: 40
Discharge: HOME | End: 2025-02-05
Payer: COMMERCIAL

## 2025-02-05 VITALS
DIASTOLIC BLOOD PRESSURE: 98 MMHG | SYSTOLIC BLOOD PRESSURE: 160 MMHG | BODY MASS INDEX: 38.34 KG/M2 | HEART RATE: 71 BPM | WEIGHT: 253 LBS | HEIGHT: 68 IN

## 2025-02-05 DIAGNOSIS — I10 BENIGN ESSENTIAL HYPERTENSION: ICD-10-CM

## 2025-02-05 PROBLEM — M79.2 NEURALGIA: Status: RESOLVED | Noted: 2021-05-14 | Resolved: 2025-02-05

## 2025-02-05 LAB
AORTIC ROOT: 3.1 CM
AORTIC VALVE MEAN VELOCITY: 9.4 M/S
ASCENDING AORTA: 3.2 CM
AV LVOT MEAN GRADIENT: 2 MMHG
AV LVOT PEAK GRADIENT: 4 MMHG
AV MEAN PRESS GRAD SYS DOP V1V2: 4 MMHG
AV PEAK GRADIENT: 7 MMHG
AV VELOCITY RATIO: 0.78
AV VMAX SYS DOP: 1.3 M/S
BSA FOR ECHO PROCEDURE: 2.26 M2
DOP CALC AO VTI: 34.85 CM
DOP CALC LVOT PEAK VEL VTI: 27.18 CM
DOP CALC LVOT PEAK VEL: 0.98 M/S
E WAVE DECELERATION TIME: 186 MS
E/A RATIO: 2.1
FRACTIONAL SHORTENING: 38 (ref 28–44)
INTERVENTRICULAR SEPTUM IN DIASTOLE (PARASTERNAL SHORT AXIS VIEW): 1.1 CM
INTERVENTRICULAR SEPTUM: 1.1 CM (ref 0.6–1.1)
LAAS-AP2: 29.3 CM2
LAAS-AP4: 32.2 CM2
LEFT ATRIUM SIZE: 4.3 CM
LEFT ATRIUM VOLUME (MOD BIPLANE): 111 ML
LEFT ATRIUM VOLUME INDEX (MOD BIPLANE): 49.1 ML/M2
LEFT INTERNAL DIMENSION IN SYSTOLE: 3 CM (ref 2.1–4)
LEFT VENTRICULAR INTERNAL DIMENSION IN DIASTOLE: 4.8 CM (ref 3.5–6)
LEFT VENTRICULAR POSTERIOR WALL IN END DIASTOLE: 1 CM
LEFT VENTRICULAR STROKE VOLUME: 73 ML
LV EF US.2D.A4C+ESTIMATED: 68 %
LVSV (TEICH): 73 ML
MV E'TISSUE VEL-LAT: 11 CM/S
MV E'TISSUE VEL-SEP: 9 CM/S
MV PEAK A VEL: 0.51 M/S
MV PEAK E VEL: 107 CM/S
MV STENOSIS PRESSURE HALF TIME: 54 MS
MV VALVE AREA P 1/2 METHOD: 4.07
RA PRESSURE ESTIMATED: 8 MMHG
RIGHT ATRIUM AREA SYSTOLE A4C: 19.4 CM2
RIGHT VENTRICLE ID DIMENSION: 4.1 CM
RV PSP: 21 MMHG
SL CV LEFT ATRIUM LENGTH A2C: 6.8 CM
SL CV LV EF: 60
SL CV PED ECHO LEFT VENTRICLE DIASTOLIC VOLUME (MOD BIPLANE) 2D: 109 ML
SL CV PED ECHO LEFT VENTRICLE SYSTOLIC VOLUME (MOD BIPLANE) 2D: 36 ML
TR MAX PG: 13 MMHG
TR PEAK VELOCITY: 1.8 M/S
TRICUSPID ANNULAR PLANE SYSTOLIC EXCURSION: 2.4 CM
TRICUSPID VALVE PEAK REGURGITATION VELOCITY: 1.78 M/S

## 2025-02-05 PROCEDURE — 93306 TTE W/DOPPLER COMPLETE: CPT

## 2025-02-05 PROCEDURE — 95806 SLEEP STUDY UNATT&RESP EFFT: CPT | Performed by: STUDENT IN AN ORGANIZED HEALTH CARE EDUCATION/TRAINING PROGRAM

## 2025-02-05 PROCEDURE — 93306 TTE W/DOPPLER COMPLETE: CPT | Performed by: INTERNAL MEDICINE

## 2025-02-06 DIAGNOSIS — G47.33 OSA (OBSTRUCTIVE SLEEP APNEA): Primary | ICD-10-CM

## 2025-02-07 ENCOUNTER — HOSPITAL ENCOUNTER (OUTPATIENT)
Dept: ULTRASOUND IMAGING | Facility: HOSPITAL | Age: 40
Discharge: HOME | End: 2025-02-07
Payer: COMMERCIAL

## 2025-02-07 DIAGNOSIS — I16.0 HYPERTENSIVE URGENCY: ICD-10-CM

## 2025-02-07 PROCEDURE — 76700 US EXAM ABDOM COMPLETE: CPT

## 2025-02-13 ENCOUNTER — TELEPHONE (OUTPATIENT)
Dept: SLEEP CENTER | Facility: CLINIC | Age: 40
End: 2025-02-13

## 2025-02-13 NOTE — TELEPHONE ENCOUNTER
Home sleep study resulted and shows Moderate sleep apnea with significant event  related hypoxia.   Recommendations: per Dr. Garza  PAP therapy is the usual first line therapy. Other treatment options for ANUPAMA may include weight loss, positional therapy, oral appliance, upper airway surgery, upper airway stimulation, treatment of allergies and avoidance of alcohol and sedating medications (such as opioids, benzodiazepines, and muscle relaxers) that can cause respiratory depression.     Results read by patient.     Referral to sleep medicine placed by Dr. Christy Matias, cardiologist.     Call to patient, patient aware of study results and referral placed.     Consult with Dr. Garza scheduled for 3/27/2025 @ 3:30 pm in the [place] office.

## 2025-02-17 ENCOUNTER — HOSPITAL ENCOUNTER (OUTPATIENT)
Dept: NON INVASIVE DIAGNOSTICS | Facility: HOSPITAL | Age: 40
Discharge: HOME | End: 2025-02-17
Payer: COMMERCIAL

## 2025-02-17 DIAGNOSIS — I10 BENIGN ESSENTIAL HYPERTENSION: ICD-10-CM

## 2025-02-17 PROCEDURE — 93975 VASCULAR STUDY: CPT | Performed by: SURGERY

## 2025-02-17 PROCEDURE — 93975 VASCULAR STUDY: CPT

## 2025-02-18 ENCOUNTER — OFFICE VISIT (OUTPATIENT)
Dept: HEMATOLOGY ONCOLOGY | Facility: CLINIC | Age: 40
End: 2025-02-18
Payer: COMMERCIAL

## 2025-02-18 VITALS
TEMPERATURE: 97.9 F | WEIGHT: 245 LBS | HEART RATE: 65 BPM | SYSTOLIC BLOOD PRESSURE: 147 MMHG | DIASTOLIC BLOOD PRESSURE: 98 MMHG | HEIGHT: 68 IN | OXYGEN SATURATION: 98 % | BODY MASS INDEX: 37.13 KG/M2

## 2025-02-18 DIAGNOSIS — R71.8 MICROCYTOSIS: Primary | ICD-10-CM

## 2025-02-18 DIAGNOSIS — E53.8 B12 DEFICIENCY: ICD-10-CM

## 2025-02-18 DIAGNOSIS — E61.1 IRON DEFICIENCY: ICD-10-CM

## 2025-02-18 PROCEDURE — 99204 OFFICE O/P NEW MOD 45 MIN: CPT | Performed by: NURSE PRACTITIONER

## 2025-02-18 NOTE — LETTER
2025     Freda Arevalo, DO  [address] [facility] [address]    Patient: Zo Barone   YOB: 1985   Date of Visit: 2025       Dear Dr. Arevalo:    Thank you for referring Zo Barone to me for evaluation. Below are my notes for this consultation.    If you have questions, please do not hesitate to call me. I look forward to following your patient along with you.         Sincerely,        RADHA Dumont        CC: No Recipients    RADHA Dumont  2025  9:06 PM  Sign when Signing Visit  Name: Zo Baorne      : 1985      MRN: [id number]  Encounter Provider: RADHA Dumont  Encounter Date: 2025   Encounter department: [facility] [place]  :  Assessment & Plan  Iron deficiency   Iron saturation 9% and ferritin level of 6 slightly improved since starting oral iron.  Patient states she stopped taking it for a few weeks but has recently resumed.  We discussed management of iron deficiency including continuing oral iron versus intravenous.  Patient states she is tolerating the oral without difficulty and would prefer to continue for now.  We also discussed iron rich foods and I added reading material to the after visit summary for her to review.  Patient states she does not have a very heavy menses and does try to eat iron rich foods.  She does not have a history of bariatric surgery.   Patient will be consistent with taking oral iron and we will repeat labs in 3 months with follow-up shortly after.  Will also check a vitamin B12 level in the future.  She is up-to-date on age-related health screenings.  Orders:  •  Iron Panel (Includes Ferritin, Iron Sat%, Iron, and TIBC); Future  •  CBC; Future  •  Ambulatory Referral to Hematology / Oncology    Microcytosis    Orders:  •  Iron Panel (Includes Ferritin, Iron Sat%, Iron, and TIBC); Future  •  CBC; Future  •  Vitamin B12; Future    B12 deficiency    Orders:  •  CBC;  "Future  •  Vitamin B12; Future        No follow-ups on file.    History of Present Illness  No chief complaint on file.  Consult    Pertinent Medical History    02/18/25: Patient is a 40-year-old female with history of hypertension, mastoiditis, melanoma, dyslipidemia, migraines, who was found to have iron deficiency.  In November 2024 she had an iron saturation of 6% and a ferritin level of 4.  Hemoglobin at that time was 12.1 with a hematocrit 39.2, RBC 5.33 MCV 74, platelets 505.  She was started on oral iron, repeat iron panel showed a ferritin level of 6 and iron saturation of 9%.     Review of Systems   Constitutional:  Positive for fatigue. Negative for activity change, appetite change, fever and unexpected weight change.   Respiratory:  Negative for cough and shortness of breath.    Cardiovascular:  Negative for chest pain and leg swelling.   Gastrointestinal:  Negative for abdominal pain, constipation, diarrhea and nausea.   Endocrine: Negative for cold intolerance and heat intolerance.   Musculoskeletal:  Negative for arthralgias and myalgias.   Skin: Negative.         Hair loss   Neurological:  Positive for headaches. Negative for dizziness and weakness.   Hematological:  Negative for adenopathy. Does not bruise/bleed easily.       Objective  /98 (BP Location: Left arm, Patient Position: Sitting, Cuff Size: Adult)   Pulse 65   Temp 97.9 °F (36.6 °C) (Temporal)   Ht 5' 8\" (1.727 m)   Wt 111 kg (245 lb)   SpO2 98%   BMI 37.25 kg/m²     Physical Exam  Vitals reviewed.   Constitutional:       Appearance: Normal appearance. She is well-developed.   HENT:      Head: Normocephalic and atraumatic.   Eyes:      Conjunctiva/sclera: Conjunctivae normal.      Pupils: Pupils are equal, round, and reactive to light.   Pulmonary:      Effort: Pulmonary effort is normal. No respiratory distress.   Musculoskeletal:         General: Normal range of motion.      Cervical back: Normal range of motion. "   Lymphadenopathy:      Cervical: No cervical adenopathy.   Skin:     General: Skin is dry.   Neurological:      Mental Status: She is alert and oriented to person, place, and time.   Psychiatric:         Behavior: Behavior normal.     Labs: I have reviewed the following labs:  Results for orders placed or performed in visit on 01/23/25   Basic metabolic panel   Result Value Ref Range    Sodium 140 135 - 147 mmol/L    Potassium 3.7 3.5 - 5.3 mmol/L    Chloride 103 96 - 108 mmol/L    CO2 28 21 - 32 mmol/L    ANION GAP 9 4 - 13 mmol/L    BUN 6 5 - 25 mg/dL    Creatinine 0.68 0.60 - 1.30 mg/dL    Glucose 99 65 - 140 mg/dL    Calcium 9.2 8.4 - 10.2 mg/dL    eGFR 111 ml/min/1.73sq m   CBC and Platelet   Result Value Ref Range    WBC 5.06 4.31 - 10.16 Thousand/uL    RBC 5.12 3.81 - 5.12 Million/uL    Hemoglobin 11.9 11.5 - 15.4 g/dL    Hematocrit 38.7 34.8 - 46.1 %    MCV 76 (L) 82 - 98 fL    MCH 23.2 (L) 26.8 - 34.3 pg    MCHC 30.7 (L) 31.4 - 37.4 g/dL    RDW 15.9 (H) 11.6 - 15.1 %    Platelets 285 149 - 390 Thousands/uL    MPV 9.6 8.9 - 12.7 fL   TIBC Panel (incl. Iron, TIBC, % Iron Saturation)   Result Value Ref Range    Iron Saturation 9 (L) 15 - 50 %    TIBC 442.4 250 - 450 ug/dL    Iron 39 (L) 50 - 212 ug/dL    Transferrin 316 203 - 362 mg/dL    UIBC 403 (H) 155 - 355 ug/dL   Result Value Ref Range    Ferritin 6 (L) 11 - 307 ng/mL

## 2025-02-18 NOTE — PROGRESS NOTES
Name: Zo Barone      : 1985      MRN: [id number]  Encounter Provider: RADHA Dumont  Encounter Date: 2025   Encounter department: [facility] [place]  :  Assessment & Plan  Iron deficiency   Iron saturation 9% and ferritin level of 6 slightly improved since starting oral iron.  Patient states she stopped taking it for a few weeks but has recently resumed.  We discussed management of iron deficiency including continuing oral iron versus intravenous.  Patient states she is tolerating the oral without difficulty and would prefer to continue for now.  We also discussed iron rich foods and I added reading material to the after visit summary for her to review.  Patient states she does not have a very heavy menses and does try to eat iron rich foods.  She does not have a history of bariatric surgery.   Patient will be consistent with taking oral iron and we will repeat labs in 3 months with follow-up shortly after.  Will also check a vitamin B12 level in the future.  She is up-to-date on age-related health screenings.  Orders:    Iron Panel (Includes Ferritin, Iron Sat%, Iron, and TIBC); Future    CBC; Future    Ambulatory Referral to Hematology / Oncology    Microcytosis    Orders:    Iron Panel (Includes Ferritin, Iron Sat%, Iron, and TIBC); Future    CBC; Future    Vitamin B12; Future    B12 deficiency    Orders:    CBC; Future    Vitamin B12; Future        No follow-ups on file.    History of Present Illness   No chief complaint on file.  Consult    Pertinent Medical History     25: Patient is a 40-year-old female with history of hypertension, mastoiditis, melanoma, dyslipidemia, migraines, who was found to have iron deficiency.  In 2024 she had an iron saturation of 6% and a ferritin level of 4.  Hemoglobin at that time was 12.1 with a hematocrit 39.2, RBC 5.33 MCV 74, platelets 505.  She was started on oral iron, repeat iron panel showed a ferritin  "level of 6 and iron saturation of 9%.     Review of Systems   Constitutional:  Positive for fatigue. Negative for activity change, appetite change, fever and unexpected weight change.   Respiratory:  Negative for cough and shortness of breath.    Cardiovascular:  Negative for chest pain and leg swelling.   Gastrointestinal:  Negative for abdominal pain, constipation, diarrhea and nausea.   Endocrine: Negative for cold intolerance and heat intolerance.   Musculoskeletal:  Negative for arthralgias and myalgias.   Skin: Negative.         Hair loss   Neurological:  Positive for headaches. Negative for dizziness and weakness.   Hematological:  Negative for adenopathy. Does not bruise/bleed easily.       Objective   /98 (BP Location: Left arm, Patient Position: Sitting, Cuff Size: Adult)   Pulse 65   Temp 97.9 °F (36.6 °C) (Temporal)   Ht 5' 8\" (1.727 m)   Wt 111 kg (245 lb)   SpO2 98%   BMI 37.25 kg/m²     Physical Exam  Vitals reviewed.   Constitutional:       Appearance: Normal appearance. She is well-developed.   HENT:      Head: Normocephalic and atraumatic.   Eyes:      Conjunctiva/sclera: Conjunctivae normal.      Pupils: Pupils are equal, round, and reactive to light.   Pulmonary:      Effort: Pulmonary effort is normal. No respiratory distress.   Musculoskeletal:         General: Normal range of motion.      Cervical back: Normal range of motion.   Lymphadenopathy:      Cervical: No cervical adenopathy.   Skin:     General: Skin is dry.   Neurological:      Mental Status: She is alert and oriented to person, place, and time.   Psychiatric:         Behavior: Behavior normal.     Labs: I have reviewed the following labs:  Results for orders placed or performed in visit on 01/23/25   Basic metabolic panel   Result Value Ref Range    Sodium 140 135 - 147 mmol/L    Potassium 3.7 3.5 - 5.3 mmol/L    Chloride 103 96 - 108 mmol/L    CO2 28 21 - 32 mmol/L    ANION GAP 9 4 - 13 mmol/L    BUN 6 5 - 25 mg/dL    " Creatinine 0.68 0.60 - 1.30 mg/dL    Glucose 99 65 - 140 mg/dL    Calcium 9.2 8.4 - 10.2 mg/dL    eGFR 111 ml/min/1.73sq m   CBC and Platelet   Result Value Ref Range    WBC 5.06 4.31 - 10.16 Thousand/uL    RBC 5.12 3.81 - 5.12 Million/uL    Hemoglobin 11.9 11.5 - 15.4 g/dL    Hematocrit 38.7 34.8 - 46.1 %    MCV 76 (L) 82 - 98 fL    MCH 23.2 (L) 26.8 - 34.3 pg    MCHC 30.7 (L) 31.4 - 37.4 g/dL    RDW 15.9 (H) 11.6 - 15.1 %    Platelets 285 149 - 390 Thousands/uL    MPV 9.6 8.9 - 12.7 fL   TIBC Panel (incl. Iron, TIBC, % Iron Saturation)   Result Value Ref Range    Iron Saturation 9 (L) 15 - 50 %    TIBC 442.4 250 - 450 ug/dL    Iron 39 (L) 50 - 212 ug/dL    Transferrin 316 203 - 362 mg/dL    UIBC 403 (H) 155 - 355 ug/dL   Result Value Ref Range    Ferritin 6 (L) 11 - 307 ng/mL

## 2025-03-27 ENCOUNTER — OFFICE VISIT (OUTPATIENT)
Dept: SLEEP CENTER | Facility: CLINIC | Age: 40
End: 2025-03-27
Payer: COMMERCIAL

## 2025-03-27 VITALS
WEIGHT: 235 LBS | OXYGEN SATURATION: 94 % | SYSTOLIC BLOOD PRESSURE: 159 MMHG | HEART RATE: 71 BPM | RESPIRATION RATE: 16 BRPM | BODY MASS INDEX: 35.61 KG/M2 | HEIGHT: 68 IN | TEMPERATURE: 98.4 F | DIASTOLIC BLOOD PRESSURE: 103 MMHG

## 2025-03-27 DIAGNOSIS — E66.812 CLASS 2 SEVERE OBESITY WITH SERIOUS COMORBIDITY AND BODY MASS INDEX (BMI) OF 35.0 TO 35.9 IN ADULT, UNSPECIFIED OBESITY TYPE (HCC): ICD-10-CM

## 2025-03-27 DIAGNOSIS — G47.33 OSA (OBSTRUCTIVE SLEEP APNEA): Primary | ICD-10-CM

## 2025-03-27 DIAGNOSIS — E66.01 CLASS 2 SEVERE OBESITY WITH SERIOUS COMORBIDITY AND BODY MASS INDEX (BMI) OF 35.0 TO 35.9 IN ADULT, UNSPECIFIED OBESITY TYPE (HCC): ICD-10-CM

## 2025-03-27 PROCEDURE — 99203 OFFICE O/P NEW LOW 30 MIN: CPT | Performed by: STUDENT IN AN ORGANIZED HEALTH CARE EDUCATION/TRAINING PROGRAM

## 2025-03-27 NOTE — ASSESSMENT & PLAN NOTE
Discussed the importance of maintaining a healthy weight on SDB control/management, and vice versa.  Encouraged lifestyle practices to maintain a healthy weight (including diet, exercise).  Reviewed that if patient ever wishes for a formal referral to Weight Management, they can let myself or their PCP know    Orders:    Cpap DME

## 2025-03-27 NOTE — ASSESSMENT & PLAN NOTE
Zo is a very pleasant 40-year-old woman with PMHx of HTN who presents for ANUPAMA (JAYDEN 23.5, O2 elisa 75% 2/2025). While she has few symptoms of this, it is moderate in severity, and given the risks of untreated ANUPAMA coupled with her existing diagnosis of HTN, I do recommend moving forward with treatment.     Discussed with patient the pathophysiology of OSAS and medical conditions associated with OSAS such as DM, HTN, CAD, Depression, Stroke, Headache.  Reviewed sleep study results at length with the patient today  Treatment options including surgery, Dental appliances, positional therapy, and CPAP were discussed.  Will start AutoPAP 5-20 cmH2O with a nasal mask..   Advised the patient that she should be receiving a call to schedule with their DME in ~2 weeks to receive the device.  Reviewed CMS/insurance criteria and resupply guidelines  Advised patient to avoid activities that could harm self or others when tired/sleepy, including driving.  Discussed importance of good sleep hygiene.  Of note, we did discuss that data has shown that taking oral iron every other day leads to better absorption than when taken daily, however I recommended she discuss this with her Hematology team.     Orders:    Ambulatory Referral to Sleep Medicine    Cpap DME

## 2025-03-27 NOTE — PROGRESS NOTES
Name: Zo Barone      : 1985      MRN: [id number]  Encounter Provider: Chapo Garza DO  Encounter Date: 3/27/2025   Encounter department: [facility] [place]  :  CONSULTING PROVIDER:  Freda Arevalo DO  [address] [place] [address]    Assessment & Plan  ANUPAMA (obstructive sleep apnea)  Zo is a very pleasant 40-year-old woman with PMHx of HTN who presents for ANUPAMA (JAYDEN 23.5, O2 elisa 75% 2025). While she has few symptoms of this, it is moderate in severity, and given the risks of untreated ANUPAMA coupled with her existing diagnosis of HTN, I do recommend moving forward with treatment.     Discussed with patient the pathophysiology of OSAS and medical conditions associated with OSAS such as DM, HTN, CAD, Depression, Stroke, Headache.  Reviewed sleep study results at length with the patient today  Treatment options including surgery, Dental appliances, positional therapy, and CPAP were discussed.  Will start AutoPAP 5-20 cmH2O with a nasal mask..   Advised the patient that she should be receiving a call to schedule with their DME in ~2 weeks to receive the device.  Reviewed CMS/insurance criteria and resupply guidelines  Advised patient to avoid activities that could harm self or others when tired/sleepy, including driving.  Discussed importance of good sleep hygiene.  Of note, we did discuss that data has shown that taking oral iron every other day leads to better absorption than when taken daily, however I recommended she discuss this with her Hematology team.     Orders:    Ambulatory Referral to Sleep Medicine    Cpap DME    Class 2 severe obesity with serious comorbidity and body mass index (BMI) of 35.0 to 35.9 in adult, unspecified obesity type (HCC)    Discussed the importance of maintaining a healthy weight on SDB control/management, and vice versa.  Encouraged lifestyle practices to maintain a healthy weight (including diet, exercise).  Reviewed that if patient ever  wishes for a formal referral to Weight Management, they can let myself or their PCP know    Orders:    Cpap DME        Follow-up:  She will Return in about 2 months (around 5/27/2025) for Compliance Visit.    Thank you for allowing me to participate in the care of your patient.  If there are any questions regarding evaluation please feel free to reach out.       ________________________________________________________________________________________________    Subjective:     HPI: Zo Barone is a 40 y.o. female with PMHx of HTN who presents for ANUPAMA (JAYDEN 23.5, O2 elisa 75% 2/2025).    HSAT was ordered by her cardiologist as a result of elevated BP.        SLEEP-WAKE SCHEDULE  Sleep Schedule:  Weekdays:  Bedtime: 2230   Asleep in <15 minutes   Nighttime awakenings: 1-2     Wake: 0630; generally feels refreshed    Naps: 0    Average total sleep time (in a 24 hour period): ~7 hours.    Weekends:   Same    Sleep-Related Details:  Preferred Sleep Position: supine and side(s)  SDB Symptoms: snoring, witnessed apneas, gasping/choking, multiple awakenings, and morning headaches  Bruxism: Yes,    Nocturnal GERD: No  Sleep-Related Hallucinations: No   Sleep Paralysis: No       RLS Symptoms: Denies  -On iron for anemia, per Hematology   -Iron studies (1/23/2025):   -Iron: 39   -TIBC: 442.4   -Transferrin saturation: 9%   -Ferritin: 6        Parasomnias:  She denies any parasomnia activity.    Wake-Related Details:  Daytime Sleepiness: No   Work Schedule: , Works for [employer]. All first shift  Drowsy Driving: No  Caffeine: No  Alcohol Use: No  Substance Use: No  Tobacco Use: No  Weight Change: Lost ~30 pounds on purpose, since December    She does not have difficulty with memory or concentration.      PAST TREATMENTS:  -None    PRIOR SLEEP STUDIES:  -HSAT 2/4/2025: TRT: 519 minutes, JAYDEN: 23.5, O2 elisa: 75%    OTHER RELEVANT LABS AND STUDIES:  -None      Sitting and reading: (Patient-Rptd) Would  "never doze  Watching TV: (Patient-Rptd) Moderate chance of dozing  Sitting, inactive in a public place (e.g. a theatre or a meeting): (Patient-Rptd) Would never doze  As a passenger in a car for an hour without a break: (Patient-Rptd) Slight chance of dozing  Lying down to rest in the afternoon when circumstances permit: (Patient-Rptd) High chance of dozing  Sitting and talking to someone: (Patient-Rptd) Would never doze  Sitting quietly after a lunch without alcohol: (Patient-Rptd) Would never doze  In a car, while stopped for a few minutes in traffic: (Patient-Rptd) Would never doze  Total score: (Patient-Rptd) 6     Review of Systems  Pertinent positives/negatives included in HPI and also as noted:     Medical History Reviewed by provider this encounter:  Tobacco  Soc Hx    .  Objective   There were no vitals taken for this visit.       Physical Exam  PHYSICAL EXAMINATION:  Vital Signs:  BP (!) 159/103 (BP Location: Left arm, Patient Position: Sitting, Cuff Size: Large)   Pulse 71   Temp 98.4 °F (36.9 °C) (Temporal)   Resp 16   Ht 5' 8\" (1.727 m)   Wt 107 kg (235 lb)   SpO2 94%   BMI 35.73 kg/m²  Body mass index is 35.73 kg/m².    Constitutional: NAD, well appearing   Mental Status: AAOx3  Neck Circumference: Neck Circumference: 16.5 inches  Skin: Warm, dry, no rashes noted   Eyes: PERRL, normal conjunctiva  ENT: Nasal congestion absent  Posterior Airspace:   [place] Tongue Position: 4  Retrognathia: absent  Overbite: absent  High Arched Palate: present  Tongue Scalloping/Ridging: absent  Uvula: normal  Chest: No evidence of respiratory distress, no accessory muscle use; no evidence of peripheral cyanosis  Abdomen: Soft, NT/ND  Extremities: No digital clubbing or pedal edema    NEUROLOGICAL EXAM:  General: Awake, alert, speech fluent, comprehension, naming, repetition intact. Short and long term memory intact.  CN: PERRL, EOMI without nystagmus, facial sensation and strength are normal and symmetric, " hearing is intact to conversational tone, palate and tongue movements are intact and symmetric.  Motor: Normal tone, bulk and strength bilaterally.   Sensation: LT intact throughout.  Gait: Able to walk without difficulty. Stance normal.    Data  Lab Results   Component Value Date    HGB 11.9 01/23/2025    HCT 38.7 01/23/2025    MCV 76 (L) 01/23/2025      Lab Results   Component Value Date    GLUCOSE 90 04/17/2015    CALCIUM 9.2 01/23/2025     04/17/2015    K 3.7 01/23/2025    CO2 28 01/23/2025     01/23/2025    BUN 6 01/23/2025    CREATININE 0.68 01/23/2025     Lab Results   Component Value Date    IRON 39 (L) 01/23/2025    TIBC 442.4 01/23/2025    FERRITIN 6 (L) 01/23/2025     Lab Results   Component Value Date    AST 19 11/16/2024    ALT 18 11/16/2024         Electronically signed by:    Chapo Garza DO  Board-Certified Neurology and Sleep Medicine  [facility]  03/27/25

## 2025-03-27 NOTE — PATIENT INSTRUCTIONS
PAP Therapy Initiation  I will place a prescription for AutoPAP 5-20 cmH2O with a nasal mask.  You will ultimately receive your machine and regular supplies from a DME (durable medical equipment) company.   After your visit today, at the  you will discuss the DME options and select your preferred supplier. My prescription will then be sent to the DME of your choice, and they should be reaching out to you within a few weeks to schedule initial device set-up.  If not done after your visit today, please call our [place] office ([phone] option 1, then option 3) to do so.  You should be eligible for new supplies approximately every 3-6 months, depending on your insurance coverage.  If your mask doesn't fit well, call our office to schedule a formal mask fitting.  Insurance requires regular usage and periodic office follow ups for PAP therapy, to continue to cover supplies.  Insurance requires a follow-up in the office within 90 days of starting your new PAP device.  This should have been scheduled today, otherwise can be scheduled when you call the office number listed above.  Recommend the desensitization techniques we discussed today (wearing during the day, during sedate activities such as watching TV or reading, to help your body adjust and get used to the mask) to help with subconscious nighttime mask removal and with overall mask comfort.        CMS/Insurance Requirements    Your insurance requires a face-to-face follow up visit within a 31-90 day period after starting CPAP.  Your insurance requires compliance with CPAP, which is at least 4 hours per night for 70% of the time. This must be done over a 30 day period and must occur within the initial 31-90 day period after starting CPAP.  Your insurance also requires at least yearly follow ups to continue to pay for CPAP supplies.    PAP Supply Guidelines    Below are the guidelines for reordering your supplies. You will be responsible for your  deductible, co payments, and out of pocket expenses.    Item Frequency   Nasal Mask (no headgear) 1 every 3 months   Nasal Mask Cushion 1 every 2 weeks   Full Face Mask (no headgear) 1 every 3 months   Full Face Mask Cushion 1 every month   Nasal Pillows 1 every 2 weeks   Headgear 1 every 6 months   hin Strap 1 every 6 months   kevyn 1 every 3 months   Filters: Reusable 1 every 6 months   Filters: Disposable 1 every 2 weeks   Humidifier Chamber(disposable) 1 every 6 months       About Your PAP Therapy    Continuous Positive Airway Pressure/Bilevel Therapy  You have been prescribed Positive Airway Pressure (PAP) therapy to treat sleep apnea. The most common type of sleep apnea is obstructive sleep apnea (ANUPAMA), a condition in which the upper airway collapses during sleep. This obstruction keeps air from getting into your lungs. The prescribed PAP machine (CPAP or Bilevel or ASV) will smoothly blow air into your airway to prevent it from closing. The machine will use a mask to deliver the air through your nose and/or mouth. These devices are available in various sizes and styles.    It will take some time for you to get used to the new equipment and it may take a while for you to begin to feel the benefits of PAP therapy. Please be patient. If you are having problems adjusting to your machine, please contact us for help.    Beginning your PAP Therapy  Assembly:  Place your PAP machine on a level surface near your bed.  To prevent injury, do not place the machine higher than your head.  Keep the machine at least 12 inches away from anything that may block the vents.  Plug the machine into a properly grounded electrical outlet. It is better to avoid using an extension cord. If necessary, use a heavy-duty one.  If you are NOT using a humidifier with you PAP equipment:  Attach one end of your 6-foot tubing to the PAP unit outlet and the other end to your mask. (If your mask does not have a built-in exhalation port, a  "special exhalation valve should be used between the mask and the 6-foot tubing.)  Attach the headgear to your mask.  If you are using a humidifier with your PAP equipment:  Fill the humidifier with DISTILLED water to the maximum fill line.  Attach the humidifier to the PAP unit's outlet as instructed by the respiratory therapist with your home care company. Attach one end of your 6-foot tubing to the humidifier outlet and the other end to your mask. (If your mask does not have a built-in exhalation port, a special exhalation valve should be used between the mask and the 6-foot tubing.)  If you have been prescribed oxygen to be used with the PAP machine, you will be instructed on how to attach your oxygen tubing. Always turn off the oxygen tank before turning off your PAP machine.    Getting Started:  Wash your face and apply the mask and headgear as instructed by the sleep technologist or respiratory therapist. The mask should fit snugly to prevent air leaks, but not so tight as to cause skin irritation or discomfort.  Turn the PAP power switch to the ON position. You should feel air blowing through the mask. Breathe normally and adjust the mask gently if you feel an air leak.  If there are no leaks, activate the \"ramp\" feature on your PAP machine. The ramp allows a lower pressure to be delivered for a designated period of time (usually 5 to 45 minutes) to allow you to relax and  fall asleep more easily. The pressure will then gradually increase to the prescribed pressure that controls your apnea.  Remember to turn OFF your PAP unit when it is not in use.    Care and Maintenance    Headgear should be washed as needed. Daily inspection and weekly washings are recommended. Do not disassemble the straps. Machine wash in warm water, making sure to attach Velcro hooks and tabs before washing. Line dry or machine dry on a low setting.  Masks should be washed every day. Daily inspection is recommended. Leave the mask and " tubing attached. Gently wash the mask with a soft cloth using warm water and mild detergent, concentrating on the mask cushion flaps. DO NOT use alcohol or bleach. Rinse thoroughly and air dry.  Tubing and headgear should be washed weekly. Daily inspection is recommended. Wash in warm water and mild detergent and rinse thoroughly. Hook the tubing to the machine and blow until dry.  Humidifier should be washed daily and filled with DISTILLED water before use. Wash with warm water and mild detergent. Rinse thoroughly and air dry.  Disposable filters should be replaced once a month. Wash reusable foam filters with warm water and mild detergent at least once a month. Rinse thoroughly and dry with paper towels.  Avoid  that contain fragrance or conditioners, as these will leave a residue.  NEVER iron any soft goods.    Troubleshooting    If the machine fails to turn on:  Make sure that the PAP machine is plugged into a grounded outlet.  Make sure that the ON/OFF switch is in the ON position.  Make sure that the wall outlet has power.    If there is no pressure coming from your machine:  Make sure that the inlet filter is clean and unblocked.  Make sure that the cooling fan is unblocked and that air is flowing freely.  Make sure that all tubing is securely connected.    If your PAP machine still fails to operate, please call your DME for assistance.    What You Should Know About Insurance    There are many different insurance policies and coverage for PAP equipment will vary. Find out what your coverage provides and what your responsibilities are as a consumer. PAP therapy equipment is considered Durable Medical Equipment (DME). Here are a few questions to ask your insurance provider:  What benefits do I have for DME? Do I have a deductible and/or co pay for DME?  Is there a repair or replacement plan for durable medical equipment?  How often can I receive a replacement mask, tubing, headgear and filters?  Do I  have to demonstrate that I am using my PAP device?  o How do I do that?    Important Information    It is very important to keep your PAP equipment and supplies clean. The life of the supplies depends on the care they receive. Under normal circumstances, expect the mask and headgear to last 9-12 months. Refer to the owner's manual for more information.    Important Safety Reminders    Keep equipment free from obstruction.  Use your PAP equipment as directed.  DO NOT try to adjust your pressure setting.  DO NOT block the exhalation port or valve.  Keep filters clean to prevent overheating.  If a humidifier is being used, place it at a level lower than your head.  If using a heated humidifier, allow unit to cool before cleaning or refilling.  Follow safety guidelines regarding oxygen equipment.DO NOT operate multiple electrical devices from one outlet.  If you have a medical emergency, contact the Emergency Medical Services.

## 2025-03-28 ENCOUNTER — TELEPHONE (OUTPATIENT)
Dept: SLEEP CENTER | Facility: CLINIC | Age: 40
End: 2025-03-28

## 2025-03-28 LAB

## 2025-04-02 LAB

## 2025-04-04 ENCOUNTER — APPOINTMENT (OUTPATIENT)
Dept: LAB | Facility: CLINIC | Age: 40
End: 2025-04-04
Payer: COMMERCIAL

## 2025-04-04 DIAGNOSIS — E53.8 B12 DEFICIENCY: ICD-10-CM

## 2025-04-04 DIAGNOSIS — R71.8 MICROCYTOSIS: ICD-10-CM

## 2025-04-04 DIAGNOSIS — I16.0 HYPERTENSIVE URGENCY: ICD-10-CM

## 2025-04-04 DIAGNOSIS — E61.1 IRON DEFICIENCY: ICD-10-CM

## 2025-04-04 LAB
BASOPHILS # BLD AUTO: 0.06 THOUSANDS/ÂΜL (ref 0–0.1)
BASOPHILS NFR BLD AUTO: 1 % (ref 0–1)
EOSINOPHIL # BLD AUTO: 0.18 THOUSAND/ÂΜL (ref 0–0.61)
EOSINOPHIL NFR BLD AUTO: 3 % (ref 0–6)
ERYTHROCYTE [DISTWIDTH] IN BLOOD BY AUTOMATED COUNT: 16.6 % (ref 11.6–15.1)
FERRITIN SERPL-MCNC: 4 NG/ML (ref 11–307)
HCT VFR BLD AUTO: 37.8 % (ref 34.8–46.1)
HGB BLD-MCNC: 11.9 G/DL (ref 11.5–15.4)
IMM GRANULOCYTES # BLD AUTO: 0.02 THOUSAND/UL (ref 0–0.2)
IMM GRANULOCYTES NFR BLD AUTO: 0 % (ref 0–2)
IRON SATN MFR SERPL: 11 % (ref 15–50)
IRON SERPL-MCNC: 46 UG/DL (ref 50–212)
LYMPHOCYTES # BLD AUTO: 1.37 THOUSANDS/ÂΜL (ref 0.6–4.47)
LYMPHOCYTES NFR BLD AUTO: 26 % (ref 14–44)
MCH RBC QN AUTO: 24.8 PG (ref 26.8–34.3)
MCHC RBC AUTO-ENTMCNC: 31.5 G/DL (ref 31.4–37.4)
MCV RBC AUTO: 79 FL (ref 82–98)
MONOCYTES # BLD AUTO: 0.46 THOUSAND/ÂΜL (ref 0.17–1.22)
MONOCYTES NFR BLD AUTO: 9 % (ref 4–12)
NEUTROPHILS # BLD AUTO: 3.19 THOUSANDS/ÂΜL (ref 1.85–7.62)
NEUTS SEG NFR BLD AUTO: 61 % (ref 43–75)
NRBC BLD AUTO-RTO: 0 /100 WBCS
PLATELET # BLD AUTO: 302 THOUSANDS/UL (ref 149–390)
PMV BLD AUTO: 11.1 FL (ref 8.9–12.7)
RBC # BLD AUTO: 4.79 MILLION/UL (ref 3.81–5.12)
TIBC SERPL-MCNC: 407.4 UG/DL (ref 250–450)
TRANSFERRIN SERPL-MCNC: 291 MG/DL (ref 203–362)
UIBC SERPL-MCNC: 361 UG/DL (ref 155–355)
VIT B12 SERPL-MCNC: 240 PG/ML (ref 180–914)
WBC # BLD AUTO: 5.28 THOUSAND/UL (ref 4.31–10.16)

## 2025-04-04 PROCEDURE — 82607 VITAMIN B-12: CPT

## 2025-04-04 PROCEDURE — 36415 COLL VENOUS BLD VENIPUNCTURE: CPT

## 2025-04-04 PROCEDURE — 83540 ASSAY OF IRON: CPT

## 2025-04-04 PROCEDURE — 82728 ASSAY OF FERRITIN: CPT

## 2025-04-04 PROCEDURE — 83550 IRON BINDING TEST: CPT

## 2025-04-04 PROCEDURE — 85025 COMPLETE CBC W/AUTO DIFF WBC: CPT

## 2025-04-07 DIAGNOSIS — I10 HYPERTENSION, UNSPECIFIED TYPE: ICD-10-CM

## 2025-04-07 RX ORDER — SPIRONOLACTONE 25 MG/1
25 TABLET ORAL DAILY
Qty: 90 TABLET | Refills: 3 | Status: SHIPPED | OUTPATIENT
Start: 2025-04-07

## 2025-04-10 ENCOUNTER — OFFICE VISIT (OUTPATIENT)
Dept: URGENT CARE | Facility: CLINIC | Age: 40
End: 2025-04-10
Payer: COMMERCIAL

## 2025-04-10 VITALS
RESPIRATION RATE: 18 BRPM | DIASTOLIC BLOOD PRESSURE: 88 MMHG | TEMPERATURE: 98.2 F | HEART RATE: 66 BPM | SYSTOLIC BLOOD PRESSURE: 150 MMHG | OXYGEN SATURATION: 97 %

## 2025-04-10 DIAGNOSIS — J02.0 STREP PHARYNGITIS: Primary | ICD-10-CM

## 2025-04-10 DIAGNOSIS — J02.9 SORE THROAT: ICD-10-CM

## 2025-04-10 LAB — S PYO AG THROAT QL: POSITIVE

## 2025-04-10 PROCEDURE — 87880 STREP A ASSAY W/OPTIC: CPT

## 2025-04-10 PROCEDURE — 99213 OFFICE O/P EST LOW 20 MIN: CPT

## 2025-04-10 RX ORDER — AZITHROMYCIN 250 MG/1
TABLET, FILM COATED ORAL
Qty: 6 TABLET | Refills: 0 | Status: SHIPPED | OUTPATIENT
Start: 2025-04-10 | End: 2025-04-14

## 2025-04-10 NOTE — PROGRESS NOTES
[facility]        NAME: Zo Barone is a 40 y.o. female  : 1985    MRN: [id number]  DATE: April 10, 2025  TIME: 2:12 PM    Assessment and Plan   Strep pharyngitis [J02.0]  1. Strep pharyngitis  azithromycin (ZITHROMAX) 250 mg tablet      2. Sore throat  POCT rapid strepA        Rapid strep in office positive.  Will start on azithromycin given amoxicillin allergy.  Recommend supportive care with OTC Tylenol as needed for fevers or pain.  Recommend rest and increase fluid intake. Instructed patient to follow-up with PCP for no improvement or worsening of symptoms.  Patient educated on red flag symptoms and when to proceed to the ED.  Patient agreeable and understands current treatment plan.     Patient Instructions     Patient Instructions   Please take Azithromycin daily for the next 5 days.     Your rapid strep test in office was positive.     Please complete the entire course of antibiotics as directed.     After 24 hours on antibiotics, you are no longer considered contagious.   After 48 hours on antibiotics, please replace your toothbrush and toothpaste.    Please make sure to wash your hands frequently, don't share drinks/food, and avoid close contact with others while contagious. You can try warm salt water gargles, ice chips, tea with honey, lozenges, and/or OTC chloraseptic spray. Tylenol and Ibuprofen may also be used to help alleviate throat pain.     If you develop a prolonged high fever, difficulty breathing, trouble swallowing, worsening pain, difficulty managing secretions, feel like your throat is closing, decreased fluid intake, or urination, any new or concerning symptoms please proceed to the ER.        Follow up with PCP in 3-5 days.  Proceed to  ER if symptoms worsen.    Chief Complaint     Chief Complaint   Patient presents with   • Sore Throat     Started 2 days ago          History of Present Illness       40-year-old female presents to the clinic for evaluation of sore  throat x 2 days.  Patient reports her sore throat has been gradually worsening.  She reports associated symptoms including ear pain, mild nausea and painful swallowing.  Patient denies any fevers, chills, sinus congestion, runny nose, cough, shortness of breath, chest pain, vomiting, diarrhea, abdominal pain, body aches or dizziness.  Patient denies taking any OTC medications for symptoms.  Of note, patient reports she has multiple members of her family who are sick with strep.        Sore Throat   This is a new problem. The current episode started in the past 7 days. The problem has been unchanged. Neither side of throat is experiencing more pain than the other. There has been no fever. The pain is at a severity of 7/10. The pain is moderate. Associated symptoms include ear pain and trouble swallowing (painful to swallow). Pertinent negatives include no abdominal pain, congestion, coughing, diarrhea, drooling, ear discharge, headaches, hoarse voice, plugged ear sensation, neck pain, shortness of breath, stridor or vomiting. She has had exposure to strep and mono.       Review of Systems   Review of Systems   Constitutional:  Negative for appetite change, chills and fever.   HENT:  Positive for ear pain, sore throat and trouble swallowing (painful to swallow). Negative for congestion, drooling, ear discharge and hoarse voice.    Respiratory:  Negative for cough, shortness of breath and stridor.    Cardiovascular:  Negative for chest pain and palpitations.   Gastrointestinal:  Positive for nausea (mild). Negative for abdominal pain, diarrhea and vomiting.   Genitourinary:  Negative for decreased urine volume.   Musculoskeletal:  Negative for arthralgias, myalgias and neck pain.   Neurological:  Negative for dizziness, light-headedness and headaches.         Current Medications       Current Outpatient Medications:   •  azithromycin (ZITHROMAX) 250 mg tablet, Take 2 tablets today then 1 tablet daily x 4 days, Disp: 6  tablet, Rfl: 0  •  carvedilol (COREG) 25 mg tablet, Take 1 tablet (25 mg total) by mouth 2 (two) times a day with meals, Disp: 90 tablet, Rfl: 3  •  EPINEPHrine (EPIPEN) 0.3 mg/0.3 mL SOAJ, Inject 0.3 mL (0.3 mg total) into a muscle once for 1 dose, Disp: 0.6 mL, Rfl: 0  •  ferrous sulfate 324 (65 Fe) mg, Take 1 tablet (324 mg total) by mouth 2 (two) times a day before meals, Disp: 180 tablet, Rfl: 1  •  fexofenadine-pseudoephedrine (ALLEGRA-D 24) 180-240 MG per 24 hr tablet, Take 1 tablet by mouth daily, Disp: , Rfl:   •  lisinopril (ZESTRIL) 40 mg tablet, Take 1 tablet (40 mg total) by mouth daily, Disp: 90 tablet, Rfl: 1  •  Multiple Vitamin (MULTI-VITAMIN DAILY PO), Take by mouth, Disp: , Rfl:   •  NIFEdipine (PROCARDIA XL) 30 mg 24 hr tablet, Take 1 tablet (30 mg total) by mouth daily, Disp: 90 tablet, Rfl: 3  •  spironolactone (ALDACTONE) 25 mg tablet, Take 1 tablet (25 mg total) by mouth daily, Disp: 90 tablet, Rfl: 3    Current Allergies     Allergies as of 04/10/2025 - Reviewed 04/10/2025   Allergen Reaction Noted   • Amoxicillin  04/25/2013   • Latex  04/25/2013   • Other  04/25/2013   • Sulfa antibiotics  04/25/2013            The following portions of the patient's history were reviewed and updated as appropriate: allergies, current medications, past family history, past medical history, past social history, past surgical history and problem list.     Past Medical History:   Diagnosis Date   • Hypertension    • Mastoiditis, acute, right 5/14/2021   • Skin cancer (melanoma) (HCC)        Past Surgical History:   Procedure Laterality Date   • CHOLECYSTECTOMY  2005   • PILONIDAL CYST DRAINAGE      incision and drainage; unknown   • SKIN SURGERY         Family History   Problem Relation Age of Onset   • Hypertension Mother    • Hypertension Father    • Melanoma Father    • Coronary artery disease Maternal Grandmother    • Melanoma Maternal Grandfather    • Alzheimer's disease Paternal Grandmother    •  Melanoma Paternal Grandmother    • Melanoma Paternal Grandfather          Medications have been verified.        Objective   /88   Pulse 66   Temp 98.2 °F (36.8 °C)   Resp 18   SpO2 97%        Physical Exam     Physical Exam  Vitals and nursing note reviewed.   Constitutional:       Appearance: She is well-developed.   HENT:      Head: Normocephalic and atraumatic.      Right Ear: Tympanic membrane and ear canal normal.      Left Ear: Tympanic membrane and ear canal normal.      Nose: No congestion or rhinorrhea.      Mouth/Throat:      Mouth: Mucous membranes are moist.      Pharynx: Oropharynx is clear. Uvula midline. Posterior oropharyngeal erythema present. No pharyngeal swelling, oropharyngeal exudate or uvula swelling.      Tonsils: No tonsillar exudate or tonsillar abscesses. 1+ on the right. 1+ on the left.   Cardiovascular:      Rate and Rhythm: Normal rate and regular rhythm.      Heart sounds: Normal heart sounds.   Pulmonary:      Effort: Pulmonary effort is normal.      Breath sounds: Normal breath sounds.   Abdominal:      General: Bowel sounds are normal.      Palpations: Abdomen is soft.   Lymphadenopathy:      Cervical: No cervical adenopathy.   Skin:     General: Skin is warm and dry.   Neurological:      General: No focal deficit present.      Mental Status: She is alert.   Psychiatric:         Mood and Affect: Mood normal.         Behavior: Behavior normal.

## 2025-04-10 NOTE — PATIENT INSTRUCTIONS
Please take Azithromycin daily for the next 5 days.     Your rapid strep test in office was positive.     Please complete the entire course of antibiotics as directed.     After 24 hours on antibiotics, you are no longer considered contagious.   After 48 hours on antibiotics, please replace your toothbrush and toothpaste.    Please make sure to wash your hands frequently, don't share drinks/food, and avoid close contact with others while contagious. You can try warm salt water gargles, ice chips, tea with honey, lozenges, and/or OTC chloraseptic spray. Tylenol and Ibuprofen may also be used to help alleviate throat pain.     If you develop a prolonged high fever, difficulty breathing, trouble swallowing, worsening pain, difficulty managing secretions, feel like your throat is closing, decreased fluid intake, or urination, any new or concerning symptoms please proceed to the ER.

## 2025-04-11 ENCOUNTER — RESULTS FOLLOW-UP (OUTPATIENT)
Dept: HEMATOLOGY ONCOLOGY | Facility: CLINIC | Age: 40
End: 2025-04-11

## 2025-04-16 LAB

## 2025-04-23 ENCOUNTER — TELEPHONE (OUTPATIENT)
Age: 40
End: 2025-04-23

## 2025-04-23 DIAGNOSIS — G47.33 OSA (OBSTRUCTIVE SLEEP APNEA): Primary | ICD-10-CM

## 2025-04-23 LAB

## 2025-04-23 NOTE — TELEPHONE ENCOUNTER
Pt. came into the [facility]'s office to be set up on Auto PAP. I gave a ResMed S11 set at 5-20 cm Erp 2 and gave an AirFit F40 (M) mask. I turn on pressure with mask fit. We discussed cleaning, resupply and compliance. Deya not downloaded but link sent.  S/N # [id number]  D/N# [phone]    Can I please get a script for a full face mask     Thank you

## 2025-05-05 ENCOUNTER — OFFICE VISIT (OUTPATIENT)
Dept: FAMILY MEDICINE CLINIC | Facility: CLINIC | Age: 40
End: 2025-05-05
Payer: COMMERCIAL

## 2025-05-05 ENCOUNTER — APPOINTMENT (OUTPATIENT)
Dept: RADIOLOGY | Facility: MEDICAL CENTER | Age: 40
End: 2025-05-05
Payer: COMMERCIAL

## 2025-05-05 VITALS
OXYGEN SATURATION: 98 % | BODY MASS INDEX: 35.31 KG/M2 | TEMPERATURE: 97.2 F | DIASTOLIC BLOOD PRESSURE: 74 MMHG | HEIGHT: 68 IN | WEIGHT: 233 LBS | SYSTOLIC BLOOD PRESSURE: 132 MMHG | HEART RATE: 63 BPM

## 2025-05-05 DIAGNOSIS — G47.33 OSA (OBSTRUCTIVE SLEEP APNEA): ICD-10-CM

## 2025-05-05 DIAGNOSIS — M21.961 DEFORMITY OF RIGHT FOOT: ICD-10-CM

## 2025-05-05 DIAGNOSIS — Z00.00 ANNUAL PHYSICAL EXAM: Primary | ICD-10-CM

## 2025-05-05 DIAGNOSIS — I10 BENIGN ESSENTIAL HYPERTENSION: ICD-10-CM

## 2025-05-05 PROCEDURE — 73610 X-RAY EXAM OF ANKLE: CPT

## 2025-05-05 PROCEDURE — 73630 X-RAY EXAM OF FOOT: CPT

## 2025-05-05 PROCEDURE — 99395 PREV VISIT EST AGE 18-39: CPT | Performed by: INTERNAL MEDICINE

## 2025-05-05 NOTE — PROGRESS NOTES
Adult Annual Physical  Name: Zo Barone      : 1985      MRN: [id number]  Encounter Provider: Freda Arevalo DO  Encounter Date: 2025   Encounter department: [facility]    :  Assessment & Plan  Annual physical exam    Orders:    Lipid panel; Future    Comprehensive metabolic panel; Future  Rx for fbw when she needs labs again for hematology   Deformity of right foot    Orders:    XR foot 3+ vw right; Future    XR ankle 3+ vw right; Future  Appears as bone spur or calcification Check xray Pt denies pain so podiatry followup prn   ANUPAMA (obstructive sleep apnea)  Pt has cpap and is adjusting to it Has followup with Dr Garza in summer        Benign essential hypertension  BP more stable and she has been following healthier lifestyle with benefit            Preventive Screenings:  - Diabetes Screening: screening up-to-date  - Cholesterol Screening: screening not indicated and has hyperlipidemia   - Lung cancer screening: screening not indicated       Depression Screening and Follow-up Plan: Patient was screened for depression during today's encounter. They screened negative with a PHQ-2 score of 0.      Gyn upcoming /Pap due this year     History of Present Illness   Pt doing better in general BP more stable She is eating healthier (less snacking and decrease calorie intake) and exercising She has hematology followup and needs to r/s egd that was scheduled to further assess rosalio She denies heartburn but did lose several pounds with lifestyle changes which seems to have helped She has cpap for about 2 weeks not and trying to adapt to that She does get at least 4 hours of compliance     Adult Annual Physical:  Patient presents for annual physical.     Diet and Physical Activity:  - Diet/Nutrition: well balanced diet, portion control, heart healthy (low sodium) diet, limited junk food and consuming 3-5 servings of fruits/vegetables daily.  - Exercise: walking, strength training exercises and 5-7  Patient WBC 1.97 2/10; neutrophils 0.6 --> 3.03 WBC 0.62 neutrophil 2/12   Neutropenic precautions   Psych consult for medication adjustment   C/t monitor w/ routine blood work    times a week on average.    Depression Screening:  - PHQ-2 Score: 0    General Health:  - Sleep: uses CPAP machine. Just got cpap - currently adjusting  - Hearing: normal hearing bilateral ears.  - Vision: no vision problems.  - Dental: regular dental visits.    /GYN Health:  - Follows with GYN: yes.   - Last menstrual cycle: 4/22/2025.   - History of STDs: no    Advanced Care Planning:  - Has an advanced directive?: no    - Has a durable medical POA?: no    - ACP document given to patient?: no      Review of Systems   Constitutional:  Negative for chills and fever.   HENT: Negative.     Eyes:  Negative for visual disturbance.   Respiratory:  Negative for cough and shortness of breath.    Cardiovascular: Negative.    Gastrointestinal: Negative.    Genitourinary: Negative.    Musculoskeletal: Negative.    Skin: Negative.    Neurological: Negative.    Psychiatric/Behavioral: Negative.       Past Medical History:   Diagnosis Date    Hypertension     Mastoiditis, acute, right 5/14/2021    Skin cancer (melanoma) (HCC)      Past Surgical History:   Procedure Laterality Date    CHOLECYSTECTOMY  2005    PILONIDAL CYST DRAINAGE      incision and drainage; unknown    SKIN SURGERY       Social History     Socioeconomic History    Marital status: Single     Spouse name: Not on file    Number of children: Not on file    Years of education: Not on file    Highest education level: Not on file   Occupational History    Not on file   Tobacco Use    Smoking status: Never     Passive exposure: Never    Smokeless tobacco: Never   Vaping Use    Vaping status: Never Used   Substance and Sexual Activity    Alcohol use: No    Drug use: No    Sexual activity: Never   Other Topics Concern    Not on file   Social History Narrative    Caffeine use    Dental care, regularly    Good dental hygiene    Christianity    Sun protection sunscreen    Uses safety equipment-seatbelts     Social Drivers of Health     Financial Resource Strain: Not on file  "  Food Insecurity: No Food Insecurity (4/30/2025)    Hunger Vital Sign     Worried About Running Out of Food in the Last Year: Never true     Ran Out of Food in the Last Year: Never true   Transportation Needs: No Transportation Needs (4/30/2025)    PRAPARE - Transportation     Lack of Transportation (Medical): No     Lack of Transportation (Non-Medical): No   Physical Activity: Not on file   Stress: Not on file   Social Connections: Not on file   Intimate Partner Violence: Not on file   Housing Stability: Low Risk  (4/30/2025)    Housing Stability Vital Sign     Unable to Pay for Housing in the Last Year: No     Number of Times Moved in the Last Year: 0     Homeless in the Last Year: No     Allergies   Allergen Reactions    Amoxicillin     Latex     Other      Adhesive Tape    Sulfa Antibiotics          Objective   /74   Pulse 63   Temp (!) 97.2 °F (36.2 °C) (Temporal)   Ht 5' 8\" (1.727 m)   Wt 106 kg (233 lb)   LMP 04/22/2025   SpO2 98%   BMI 35.43 kg/m²     Physical Exam  Vitals and nursing note reviewed.   Constitutional:       General: She is not in acute distress.     Appearance: Normal appearance. She is not ill-appearing, toxic-appearing or diaphoretic.   HENT:      Head: Normocephalic and atraumatic.      Right Ear: External ear normal.      Left Ear: External ear normal.      Nose: Nose normal.      Mouth/Throat:      Mouth: Mucous membranes are moist.   Eyes:      General: No scleral icterus.  Cardiovascular:      Rate and Rhythm: Normal rate and regular rhythm.      Pulses: Normal pulses.      Heart sounds: Normal heart sounds.   Pulmonary:      Effort: Pulmonary effort is normal. No respiratory distress.      Breath sounds: Normal breath sounds. No wheezing.   Abdominal:      General: Bowel sounds are normal. There is no distension.      Palpations: Abdomen is soft.      Tenderness: There is no abdominal tenderness.   Musculoskeletal:      Cervical back: Normal range of motion and neck " supple.      Right lower leg: No edema.      Left lower leg: No edema.   Lymphadenopathy:      Cervical: No cervical adenopathy.   Skin:     General: Skin is warm and dry.   Neurological:      General: No focal deficit present.      Mental Status: She is alert and oriented to person, place, and time. Mental status is at baseline.      Cranial Nerves: No cranial nerve deficit.   Psychiatric:         Mood and Affect: Mood normal.         Behavior: Behavior normal.         Thought Content: Thought content normal.         Judgment: Judgment normal.

## 2025-05-05 NOTE — ASSESSMENT & PLAN NOTE
Orders:    Lipid panel; Future    Comprehensive metabolic panel; Future  Rx for fbw when she needs labs again for hematology

## 2025-05-06 ENCOUNTER — RESULTS FOLLOW-UP (OUTPATIENT)
Dept: FAMILY MEDICINE CLINIC | Facility: CLINIC | Age: 40
End: 2025-05-06

## 2025-05-16 NOTE — PROGRESS NOTES
Name: Zo Barone      : 1985      MRN: [id number]  Encounter Provider: RADHA Dumont  Encounter Date: 2025   Encounter department: [facility] [place]  :  Assessment & Plan  Menorrhagia with regular cycle   Patient reports changing pad or tampon every 2 hours for the first 2 days of her menses.  She also does not repeat eating a lot of iron rich foods likely contributing to iron deficiency.  She is undergoing an EGD tomorrow for further evaluation.    Discussed starting iron infusions with Venofer 200 mg IV and vitamin B12 injections 1000 mcg IM weekly x 5.  I reviewed indications and adverse reactions including shortness of breath, chest heaviness, headache, itching, Anaphylaxis/allergic reaction, arthralgias/myalgias, nausea; agrees to proceed with plan. Patient is advised to contact our office if they has any of the symptoms.  Will repeat labs in 3-month intervals communicating through MyChart with recommendations.  I also gave patient verbal and written information on iron rich foods, Venofer infusion, and advised her to start taking a different iron supplement such as natures made ferrous fumarate gummy 18 mg once daily.  Orders:    CBC; Standing    Iron Panel (Includes Ferritin, Iron Sat%, Iron, and TIBC); Standing    Vitamin B12; Standing    Vitamin B12 deficiency   Advised patient to start taking natures made vitamin B12 gummy 3000 mcg once daily for 2 months then decrease to 1 gummy daily.  Orders:    CBC; Standing    Vitamin B12; Standing    Iron deficiency anemia due to chronic blood loss    Orders:    CBC; Standing    Iron Panel (Includes Ferritin, Iron Sat%, Iron, and TIBC); Standing    Return in about 30 weeks (around 2025) for Office Visit, Infusion - See Treatment Plan.    History of Present Illness   No chief complaint on file.  25: Patient continues on oral iron although does report some constipation.  Labs from 2025 show a  "ferritin level of 4 and iron saturation of 11%, vitamin B12 240.  CBC shows an MCV of 79, RDW 16.6 otherwise normal CBC and differential.    Pertinent Medical History   02/18/25: Patient is a 40-year-old female with history of hypertension, mastoiditis, melanoma, dyslipidemia, migraines, who was found to have iron deficiency.  In November 2024 she had an iron saturation of 6% and a ferritin level of 4.  Hemoglobin at that time was 12.1 with a hematocrit 39.2, RBC 5.33 MCV 74, platelets 505.  She was started on oral iron, repeat iron panel showed a ferritin level of 6 and iron saturation of 9%.       Review of Systems   Constitutional:  Negative for activity change, appetite change, fatigue, fever and unexpected weight change.   Respiratory:  Negative for cough and shortness of breath.    Cardiovascular:  Negative for chest pain and leg swelling.   Gastrointestinal:  Positive for constipation. Negative for abdominal pain, diarrhea and nausea.   Endocrine: Negative for cold intolerance and heat intolerance.   Musculoskeletal:  Negative for arthralgias and myalgias.   Skin: Negative.    Neurological:  Negative for dizziness, weakness and headaches.   Hematological:  Negative for adenopathy. Does not bruise/bleed easily.       Objective   /72 (BP Location: Left arm, Patient Position: Sitting, Cuff Size: Standard)   Pulse 59   Temp 97.9 °F (36.6 °C) (Temporal)   Ht 5' 8\" (1.727 m)   Wt 104 kg (229 lb)   LMP 04/22/2025   SpO2 99%   BMI 34.82 kg/m²     Physical Exam  Vitals reviewed.   Constitutional:       Appearance: Normal appearance. She is well-developed.   HENT:      Head: Normocephalic and atraumatic.     Eyes:      Conjunctiva/sclera: Conjunctivae normal.      Pupils: Pupils are equal, round, and reactive to light.     Pulmonary:      Effort: Pulmonary effort is normal. No respiratory distress.     Musculoskeletal:         General: Normal range of motion.      Cervical back: Normal range of motion. "   Lymphadenopathy:      Cervical: No cervical adenopathy.     Skin:     General: Skin is dry.     Neurological:      Mental Status: She is alert and oriented to person, place, and time.     Psychiatric:         Behavior: Behavior normal.     Labs: I have reviewed the following labs:  Results for orders placed or performed in visit on 04/10/25   POCT rapid strepA   Result Value Ref Range     RAPID STREP A Positive (A) Negative

## 2025-05-20 ENCOUNTER — TELEPHONE (OUTPATIENT)
Dept: PULMONOLOGY | Facility: CLINIC | Age: 40
End: 2025-05-20

## 2025-05-20 ENCOUNTER — OFFICE VISIT (OUTPATIENT)
Dept: HEMATOLOGY ONCOLOGY | Facility: CLINIC | Age: 40
End: 2025-05-20
Payer: COMMERCIAL

## 2025-05-20 VITALS
DIASTOLIC BLOOD PRESSURE: 72 MMHG | OXYGEN SATURATION: 99 % | HEART RATE: 59 BPM | TEMPERATURE: 97.9 F | HEIGHT: 68 IN | WEIGHT: 229 LBS | BODY MASS INDEX: 34.71 KG/M2 | SYSTOLIC BLOOD PRESSURE: 132 MMHG

## 2025-05-20 DIAGNOSIS — E53.8 VITAMIN B12 DEFICIENCY: ICD-10-CM

## 2025-05-20 DIAGNOSIS — D50.0 IRON DEFICIENCY ANEMIA DUE TO CHRONIC BLOOD LOSS: ICD-10-CM

## 2025-05-20 DIAGNOSIS — N92.0 MENORRHAGIA WITH REGULAR CYCLE: Primary | ICD-10-CM

## 2025-05-20 PROCEDURE — 99214 OFFICE O/P EST MOD 30 MIN: CPT | Performed by: NURSE PRACTITIONER

## 2025-05-20 RX ORDER — SODIUM CHLORIDE 9 MG/ML
20 INJECTION, SOLUTION INTRAVENOUS ONCE
OUTPATIENT
Start: 2025-06-04

## 2025-05-20 RX ORDER — CYANOCOBALAMIN 1000 UG/ML
1000 INJECTION, SOLUTION INTRAMUSCULAR; SUBCUTANEOUS ONCE
OUTPATIENT
Start: 2025-06-04 | End: 2025-05-20

## 2025-05-20 NOTE — PATIENT INSTRUCTIONS
[facility] iron rich foods.  Natures made iron gummy (ferrous fumarate) 18 mg (2 Gummies) once daily.       Please start taking vitamin B12 supplement such as natures made gummy 3000 mcg once daily for 2 months then decrease to 1 gummy daily.

## 2025-05-20 NOTE — LETTER
May 20, 2025     Freda Arevalo DO  [address] [facility] [address]    Patient: Zo Barone   YOB: 1985   Date of Visit: 2025       Dear Dr. Freda Arevalo DO:    Thank you for referring Zo Barone to me for evaluation. Below are my notes for this consultation.    If you have questions, please do not hesitate to call me. I look forward to following your patient along with you.         Sincerely,        RADHA Dumont        CC: No Recipients    RADHA Dumont  2025 10:38 AM  Sign when Signing Visit  Name: Zo Barone      : 1985      MRN: [id number]  Encounter Provider: RADHA Dumont  Encounter Date: 2025   Encounter department: [facility] [place]  :  Assessment & Plan  Menorrhagia with regular cycle   Patient reports changing pad or tampon every 2 hours for the first 2 days of her menses.  She also does not repeat eating a lot of iron rich foods likely contributing to iron deficiency.  She is undergoing an EGD tomorrow for further evaluation.    Discussed starting iron infusions with Venofer 200 mg IV and vitamin B12 injections 1000 mcg IM weekly x 5.  I reviewed indications and adverse reactions including shortness of breath, chest heaviness, headache, itching, Anaphylaxis/allergic reaction, arthralgias/myalgias, nausea; agrees to proceed with plan. Patient is advised to contact our office if they has any of the symptoms.  Will repeat labs in 3-month intervals communicating through MyChart with recommendations.  I also gave patient verbal and written information on iron rich foods, Venofer infusion, and advised her to start taking a different iron supplement such as natures made ferrous fumarate gummy 18 mg once daily.  Orders:    CBC; Standing    Iron Panel (Includes Ferritin, Iron Sat%, Iron, and TIBC); Standing    Vitamin B12; Standing    Vitamin B12 deficiency   Advised patient to start taking natures made  "vitamin B12 gummy 3000 mcg once daily for 2 months then decrease to 1 gummy daily.  Orders:    CBC; Standing    Vitamin B12; Standing    Iron deficiency anemia due to chronic blood loss    Orders:    CBC; Standing    Iron Panel (Includes Ferritin, Iron Sat%, Iron, and TIBC); Standing    Return in about 30 weeks (around 12/16/2025) for Office Visit, Infusion - See Treatment Plan.    History of Present Illness  No chief complaint on file.  05/20/25: Patient continues on oral iron although does report some constipation.  Labs from 4/4/2025 show a ferritin level of 4 and iron saturation of 11%, vitamin B12 240.  CBC shows an MCV of 79, RDW 16.6 otherwise normal CBC and differential.    Pertinent Medical History  02/18/25: Patient is a 40-year-old female with history of hypertension, mastoiditis, melanoma, dyslipidemia, migraines, who was found to have iron deficiency.  In November 2024 she had an iron saturation of 6% and a ferritin level of 4.  Hemoglobin at that time was 12.1 with a hematocrit 39.2, RBC 5.33 MCV 74, platelets 505.  She was started on oral iron, repeat iron panel showed a ferritin level of 6 and iron saturation of 9%.       Review of Systems   Constitutional:  Negative for activity change, appetite change, fatigue, fever and unexpected weight change.   Respiratory:  Negative for cough and shortness of breath.    Cardiovascular:  Negative for chest pain and leg swelling.   Gastrointestinal:  Positive for constipation. Negative for abdominal pain, diarrhea and nausea.   Endocrine: Negative for cold intolerance and heat intolerance.   Musculoskeletal:  Negative for arthralgias and myalgias.   Skin: Negative.    Neurological:  Negative for dizziness, weakness and headaches.   Hematological:  Negative for adenopathy. Does not bruise/bleed easily.       Objective  /72 (BP Location: Left arm, Patient Position: Sitting, Cuff Size: Standard)   Pulse 59   Temp 97.9 °F (36.6 °C) (Temporal)   Ht 5' 8\" " (1.727 m)   Wt 104 kg (229 lb)   LMP 04/22/2025   SpO2 99%   BMI 34.82 kg/m²     Physical Exam  Vitals reviewed.   Constitutional:       Appearance: Normal appearance. She is well-developed.   HENT:      Head: Normocephalic and atraumatic.     Eyes:      Conjunctiva/sclera: Conjunctivae normal.      Pupils: Pupils are equal, round, and reactive to light.     Pulmonary:      Effort: Pulmonary effort is normal. No respiratory distress.     Musculoskeletal:         General: Normal range of motion.      Cervical back: Normal range of motion.   Lymphadenopathy:      Cervical: No cervical adenopathy.     Skin:     General: Skin is dry.     Neurological:      Mental Status: She is alert and oriented to person, place, and time.     Psychiatric:         Behavior: Behavior normal.     Labs: I have reviewed the following labs:  Results for orders placed or performed in visit on 04/10/25   POCT rapid strepA   Result Value Ref Range     RAPID STREP A Positive (A) Negative

## 2025-05-20 NOTE — ASSESSMENT & PLAN NOTE
Advised patient to start taking natures made vitamin B12 gummy 3000 mcg once daily for 2 months then decrease to 1 gummy daily.  Orders:    CBC; Standing    Vitamin B12; Standing

## 2025-05-20 NOTE — ASSESSMENT & PLAN NOTE
Patient reports changing pad or tampon every 2 hours for the first 2 days of her menses.  She also does not repeat eating a lot of iron rich foods likely contributing to iron deficiency.  She is undergoing an EGD tomorrow for further evaluation.    Discussed starting iron infusions with Venofer 200 mg IV and vitamin B12 injections 1000 mcg IM weekly x 5.  I reviewed indications and adverse reactions including shortness of breath, chest heaviness, headache, itching, Anaphylaxis/allergic reaction, arthralgias/myalgias, nausea; agrees to proceed with plan. Patient is advised to contact our office if they has any of the symptoms.  Will repeat labs in 3[facility]month intervals [facility].  I also gave patient verbal and written information on iron rich foods, Venofer infusion, and advised her to start taking a different iron supplement such as natures made ferrous fumarate gummy 18 mg once daily.  Orders:    CBC; Standing    Iron Panel (Includes Ferritin, Iron Sat%, Iron, and TIBC); Standing    Vitamin B12; Standing

## 2025-05-26 DIAGNOSIS — I10 HYPERTENSION, UNSPECIFIED TYPE: ICD-10-CM

## 2025-05-27 RX ORDER — LISINOPRIL 40 MG/1
40 TABLET ORAL DAILY
Qty: 90 TABLET | Refills: 1 | Status: SHIPPED | OUTPATIENT
Start: 2025-05-27

## 2025-05-30 ENCOUNTER — OFFICE VISIT (OUTPATIENT)
Dept: CARDIOLOGY CLINIC | Facility: HOSPITAL | Age: 40
End: 2025-05-30
Payer: COMMERCIAL

## 2025-05-30 VITALS
OXYGEN SATURATION: 98 % | SYSTOLIC BLOOD PRESSURE: 148 MMHG | WEIGHT: 231.2 LBS | HEART RATE: 66 BPM | DIASTOLIC BLOOD PRESSURE: 82 MMHG | HEIGHT: 68 IN | BODY MASS INDEX: 35.04 KG/M2

## 2025-05-30 DIAGNOSIS — G47.33 OSA (OBSTRUCTIVE SLEEP APNEA): ICD-10-CM

## 2025-05-30 DIAGNOSIS — E66.01 CLASS 2 SEVERE OBESITY DUE TO EXCESS CALORIES WITH SERIOUS COMORBIDITY AND BODY MASS INDEX (BMI) OF 35.0 TO 35.9 IN ADULT (HCC): ICD-10-CM

## 2025-05-30 DIAGNOSIS — I10 BENIGN ESSENTIAL HYPERTENSION: Primary | ICD-10-CM

## 2025-05-30 DIAGNOSIS — E66.812 CLASS 2 SEVERE OBESITY DUE TO EXCESS CALORIES WITH SERIOUS COMORBIDITY AND BODY MASS INDEX (BMI) OF 35.0 TO 35.9 IN ADULT (HCC): ICD-10-CM

## 2025-05-30 PROCEDURE — 99214 OFFICE O/P EST MOD 30 MIN: CPT | Performed by: INTERNAL MEDICINE

## 2025-05-30 PROCEDURE — 93000 ELECTROCARDIOGRAM COMPLETE: CPT | Performed by: INTERNAL MEDICINE

## 2025-05-30 RX ORDER — CARVEDILOL 25 MG/1
25 TABLET ORAL 2 TIMES DAILY WITH MEALS
Qty: 180 TABLET | Refills: 3 | Status: SHIPPED | OUTPATIENT
Start: 2025-05-30

## 2025-05-30 RX ORDER — NIFEDIPINE 30 MG/1
30 TABLET, EXTENDED RELEASE ORAL DAILY
Qty: 90 TABLET | Refills: 3 | Status: SHIPPED | OUTPATIENT
Start: 2025-05-30

## 2025-05-30 NOTE — PROGRESS NOTES
Cardiology Follow-up    Zo Barone  [id number]  1985  [facility] [address] [address]      1. Benign essential hypertension  POCT ECG    carvedilol (COREG) 25 mg tablet    NIFEdipine (PROCARDIA XL) 30 mg 24 hr tablet      2. ANUPAMA (obstructive sleep apnea)        3. Class 2 severe obesity due to excess calories with serious comorbidity and body mass index (BMI) of 35.0 to 35.9 in adult (HCC)            Discussion/Summary:  Ms. Barone is a very pleasant 40-year-old female who presents to the office today for the re-evaluation of hypertension.    Her blood pressure control has improved but remains slightly suboptimal.  Her blood pressure cuff was verified for accuracy in the office today and appears to be providing higher readings than our manual cuff.  She will invest in a new blood pressure monitoring device.  We discussed further treatment options.  She recently began therapy for her known underlying obstructive sleep apnea and has been compliant with CPAP for about two weeks  We discussed escalation of her nifedipine regimen although she is concerned about progressive lower extremity edema.  Hence we have elected to make no changes for now with the hope that her blood pressure control will improve with treatment of her sleep apnea and continued weight loss.  She will monitor her blood pressure over the next few months and forward readings to the office for my review.  A low-sodium diet was reinforced.    She did undergo a renal artery duplex revealed no evidence of renal artery stenosis or fibromuscular dysplasia.  Her echo revealed no evidence of left ventricular hypertrophy and normal diastolic parameters.    I will see her back in the office in six months for re-evaluation.    History of Present Illness: Ms. Barone is a very pleasant 40-year-old female who presents to the  office today for routine follow-up.     Since her last visit with me she did undergo a sleep study revealing at least moderate obstructive sleep apnea with significant respiratory-event-related hypoxia.  She was seen in consultation by our sleep medicine colleagues and placed on AutoPap.  An echocardiogram was unremarkable with preserved biventricular systolic function and no evidence of left ventricular hypertrophy.  A renal artery duplex was unremarkable.  Her medication regimen was adjusted due to suboptimally controlled blood pressure with an increase in her carvedilol dose and addition of nifedipine.    She continues to exercise since her last visit.  She lifts weights maybe three times per week.  She also does a walking video and utilizes a walking pad.  In doing so she feels well.  She denies any exertional chest pain or shortness of breath.  She denies any signs or symptoms of congestive heart failure including lower extremity edema, paroxysmal nocturnal dyspnea, orthopnea, acute weight gain or increasing abdominal girth.  She denies lightheadedness, syncope or presyncope.  She denies palpitations or symptoms of claudication.      Patient Active Problem List   Diagnosis    Hyperlipidemia    Benign essential hypertension    Annual physical exam    History of hypokalemia    Chondromalacia patellae    Rash    Allergy to latex    Urticaria    ANUPAMA (obstructive sleep apnea)    Class 2 severe obesity due to excess calories with serious comorbidity in adult (HCC)    Iron deficiency anemia due to chronic blood loss    Vitamin B12 deficiency    Menorrhagia with regular cycle     Past Medical History:   Diagnosis Date    Allergic     Anemia 2024    Hypertension     Iron deficiency 2025    Mastoiditis, acute, right 05/14/2021    Skin cancer (melanoma) ([facility])      Social History     Socioeconomic History    Marital status: Single     Spouse name: Not on file    Number of children: Not on file    Years of education: Not on  file    Highest education level: Not on file   Occupational History    Not on file   Tobacco Use    Smoking status: Never     Passive exposure: Never    Smokeless tobacco: Never   Vaping Use    Vaping status: Never Used   Substance and Sexual Activity    Alcohol use: No    Drug use: Never    Sexual activity: Never   Other Topics Concern    Not on file   Social History Narrative    Caffeine use    Dental care, regularly    Good dental hygiene    [employer]ian    Sun protection sunscreen    Uses safety equipment-seatbelts     Social Drivers of Health     Financial Resource Strain: Not on file   Food Insecurity: No Food Insecurity (4/30/2025)    Nursing - Inadequate Food Risk Classification     Worried About Running Out of Food in the Last Year: Never true     Ran Out of Food in the Last Year: Never true     Ran Out of Food in the Last Year: Not on file   Transportation Needs: No Transportation Needs (4/30/2025)    PRAPARE - Transportation     Lack of Transportation (Medical): No     Lack of Transportation (Non-Medical): No   Physical Activity: Not on file   Stress: Not on file   Social Connections: Not on file   Intimate Partner Violence: Not on file   Housing Stability: Low Risk  (4/30/2025)    Housing Stability Vital Sign     Unable to Pay for Housing in the Last Year: No     Number of Times Moved in the Last Year: 0     Homeless in the Last Year: No      Family History   Problem Relation Name Age of Onset    Hypertension Mother Abigail Barone     Hypertension Father Duarte Barone     Melanoma Father Duarte Barone     Cancer Father Duarte Barone     Snoring Father Duarte Barone     Coronary artery disease Maternal Grandmother Manju Cordero     Heart disease Maternal Grandmother Manju Cordero     Dementia Maternal Grandmother Manju Cordero     Melanoma Maternal Grandfather Vadim Cordero     Hypertension Maternal Grandfather Vadim Cordero     Cancer Maternal Grandfather Vadim Cordero         Ocular Melanoma     Alzheimer's disease Paternal Grandmother Beba Barone     Melanoma Paternal Grandmother Beba Barone     Dementia Paternal Grandmother Beba Barone     Cancer Paternal Grandmother Beba Barone         Melanoma    Melanoma Paternal Grandfather Nathan Barone     Cancer Paternal Grandfather Nathan Barone     Hypertension Paternal Grandfather Nathan Barone      Past Surgical History:   Procedure Laterality Date    CHOLECYSTECTOMY  2005    PILONIDAL CYST DRAINAGE      incision and drainage; unknown    SKIN SURGERY         Current Outpatient Medications:     carvedilol (COREG) 25 mg tablet, Take 1 tablet (25 mg total) by mouth 2 (two) times a day with meals, Disp: 180 tablet, Rfl: 3    EPINEPHrine (EPIPEN) 0.3 mg/0.3 mL SOAJ, Inject 0.3 mL (0.3 mg total) into a muscle once for 1 dose, Disp: 0.6 mL, Rfl: 0    ferrous sulfate 324 (65 Fe) mg, Take 1 tablet (324 mg total) by mouth 2 (two) times a day before meals, Disp: 180 tablet, Rfl: 1    lisinopril (ZESTRIL) 40 mg tablet, TAKE 1 TABLET DAILY, Disp: 90 tablet, Rfl: 1    Multiple Vitamin (MULTI-VITAMIN DAILY PO), Take by mouth, Disp: , Rfl:     NIFEdipine (PROCARDIA XL) 30 mg 24 hr tablet, Take 1 tablet (30 mg total) by mouth daily, Disp: 90 tablet, Rfl: 3    spironolactone (ALDACTONE) 25 mg tablet, Take 1 tablet (25 mg total) by mouth daily, Disp: 90 tablet, Rfl: 3    fexofenadine-pseudoephedrine (ALLEGRA-D 24) 180-240 MG per 24 hr tablet, Take 1 tablet by mouth in the morning. (Patient not taking: Reported on 5/30/2025), Disp: , Rfl:   Allergies   Allergen Reactions    Amoxicillin     Latex     Other      Adhesive Tape    Sulfa Antibiotics        Imaging: No results found.    ECG: Normal sinus rhythm, normal ECG    Review of Systems:  Review of Systems   Respiratory:  Negative for apnea, shortness of breath and stridor.    Cardiovascular:  Negative for chest pain, palpitations and leg swelling.         Vitals:    05/30/25 1435 05/30/25 1455   BP: 146/98 148/82   Pulse: 66   "  SpO2: 98%    Weight: 105 kg (231 lb 3.2 oz)    Height: 5' 8\" (1.727 m)        Vitals:    05/30/25 1435   Weight: 105 kg (231 lb 3.2 oz)       Height: 5' 8\" (172.7 cm)     Physical Exam:  General appearance:  Appears stated age, alert, well appearing and in no distress  HEENT:  PERRLA, EOMI, no scleral icterus, no conjunctival pallor  NECK:  Supple, No elevated JVP, no thyromegaly, no carotid bruits  HEART:  Regular rate and rhythm, normal S1/S2, no S3/S4, no murmur or rub  LUNGS:  Clear to auscultation bilaterally  ABDOMEN:  Soft, non-tender, positive bowel sounds, no rebound or guarding, no organomegaly   EXTREMITIES:  No edema  VASCULAR:  Normal pedal pulses   SKIN: No lesions or rashes on exposed skin  NEURO:  CN II-XII intact, no focal deficits  "

## 2025-06-02 ENCOUNTER — TELEPHONE (OUTPATIENT)
Dept: INFUSION CENTER | Facility: HOSPITAL | Age: 40
End: 2025-06-02

## 2025-06-02 NOTE — TELEPHONE ENCOUNTER
Pt called to remind them of their infusion appt on 6/4/25 at 1:30pm. Spoke with pt and reviewed visitor & no show policy as well as answered any questions pt had. Pt verbally confirmed appt.

## 2025-06-04 ENCOUNTER — HOSPITAL ENCOUNTER (OUTPATIENT)
Dept: INFUSION CENTER | Facility: HOSPITAL | Age: 40
Discharge: HOME | End: 2025-06-04
Attending: INTERNAL MEDICINE
Payer: COMMERCIAL

## 2025-06-04 VITALS
TEMPERATURE: 97.8 F | OXYGEN SATURATION: 98 % | DIASTOLIC BLOOD PRESSURE: 95 MMHG | HEART RATE: 65 BPM | RESPIRATION RATE: 20 BRPM | SYSTOLIC BLOOD PRESSURE: 146 MMHG

## 2025-06-04 DIAGNOSIS — D50.0 IRON DEFICIENCY ANEMIA DUE TO CHRONIC BLOOD LOSS: ICD-10-CM

## 2025-06-04 DIAGNOSIS — N92.0 MENORRHAGIA WITH REGULAR CYCLE: Primary | ICD-10-CM

## 2025-06-04 DIAGNOSIS — E53.8 VITAMIN B12 DEFICIENCY: ICD-10-CM

## 2025-06-04 PROCEDURE — 96372 THER/PROPH/DIAG INJ SC/IM: CPT

## 2025-06-04 PROCEDURE — 96365 THER/PROPH/DIAG IV INF INIT: CPT

## 2025-06-04 RX ORDER — CYANOCOBALAMIN 1000 UG/ML
1000 INJECTION, SOLUTION INTRAMUSCULAR; SUBCUTANEOUS ONCE
Status: COMPLETED | OUTPATIENT
Start: 2025-06-04 | End: 2025-06-04

## 2025-06-04 RX ORDER — CYANOCOBALAMIN 1000 UG/ML
1000 INJECTION, SOLUTION INTRAMUSCULAR; SUBCUTANEOUS ONCE
Status: CANCELLED | OUTPATIENT
Start: 2025-06-11 | End: 2025-06-11

## 2025-06-04 RX ORDER — SODIUM CHLORIDE 9 MG/ML
20 INJECTION, SOLUTION INTRAVENOUS ONCE
Status: COMPLETED | OUTPATIENT
Start: 2025-06-04 | End: 2025-06-04

## 2025-06-04 RX ORDER — SODIUM CHLORIDE 9 MG/ML
20 INJECTION, SOLUTION INTRAVENOUS ONCE
Status: CANCELLED | OUTPATIENT
Start: 2025-06-11

## 2025-06-04 RX ADMIN — SODIUM CHLORIDE 20 ML/HR: 0.9 INJECTION, SOLUTION INTRAVENOUS at 13:43

## 2025-06-04 RX ADMIN — IRON SUCROSE 200 MG: 20 INJECTION, SOLUTION INTRAVENOUS at 13:42

## 2025-06-04 RX ADMIN — CYANOCOBALAMIN 1000 MCG: 1000 INJECTION, SOLUTION INTRAMUSCULAR; SUBCUTANEOUS at 15:03

## 2025-06-04 NOTE — PROGRESS NOTES
Pt tolerated Venofer infusion and B12 injection in L deltoid well without any complications. PIV removed without incident.    Zo Barone is aware of future appt on 6/11/25 at 2PM.     AVS declined by Zo Barone.    Pt discharged off unit in stable condition with all personal belongings.

## 2025-06-11 ENCOUNTER — HOSPITAL ENCOUNTER (OUTPATIENT)
Dept: INFUSION CENTER | Facility: HOSPITAL | Age: 40
Discharge: HOME | End: 2025-06-11
Attending: INTERNAL MEDICINE
Payer: COMMERCIAL

## 2025-06-11 VITALS
DIASTOLIC BLOOD PRESSURE: 88 MMHG | RESPIRATION RATE: 16 BRPM | SYSTOLIC BLOOD PRESSURE: 144 MMHG | HEART RATE: 69 BPM | TEMPERATURE: 96.9 F

## 2025-06-11 DIAGNOSIS — N92.0 MENORRHAGIA WITH REGULAR CYCLE: Primary | ICD-10-CM

## 2025-06-11 DIAGNOSIS — D50.0 IRON DEFICIENCY ANEMIA DUE TO CHRONIC BLOOD LOSS: ICD-10-CM

## 2025-06-11 DIAGNOSIS — E53.8 VITAMIN B12 DEFICIENCY: ICD-10-CM

## 2025-06-11 PROCEDURE — 96372 THER/PROPH/DIAG INJ SC/IM: CPT

## 2025-06-11 PROCEDURE — 96365 THER/PROPH/DIAG IV INF INIT: CPT

## 2025-06-11 RX ORDER — CYANOCOBALAMIN 1000 UG/ML
1000 INJECTION, SOLUTION INTRAMUSCULAR; SUBCUTANEOUS ONCE
Status: CANCELLED | OUTPATIENT
Start: 2025-06-19 | End: 2025-06-18

## 2025-06-11 RX ORDER — SODIUM CHLORIDE 9 MG/ML
20 INJECTION, SOLUTION INTRAVENOUS ONCE
Status: COMPLETED | OUTPATIENT
Start: 2025-06-11 | End: 2025-06-11

## 2025-06-11 RX ORDER — CYANOCOBALAMIN 1000 UG/ML
1000 INJECTION, SOLUTION INTRAMUSCULAR; SUBCUTANEOUS ONCE
Status: COMPLETED | OUTPATIENT
Start: 2025-06-11 | End: 2025-06-11

## 2025-06-11 RX ORDER — PANTOPRAZOLE SODIUM 40 MG/1
TABLET, DELAYED RELEASE ORAL
COMMUNITY
Start: 2025-05-21

## 2025-06-11 RX ORDER — SODIUM CHLORIDE 9 MG/ML
20 INJECTION, SOLUTION INTRAVENOUS ONCE
Status: CANCELLED | OUTPATIENT
Start: 2025-06-19

## 2025-06-11 RX ADMIN — SODIUM CHLORIDE 20 ML/HR: 0.9 INJECTION, SOLUTION INTRAVENOUS at 14:15

## 2025-06-11 RX ADMIN — CYANOCOBALAMIN 1000 MCG: 1000 INJECTION, SOLUTION INTRAMUSCULAR; SUBCUTANEOUS at 14:16

## 2025-06-11 RX ADMIN — IRON SUCROSE 200 MG: 20 INJECTION, SOLUTION INTRAVENOUS at 14:15

## 2025-06-11 NOTE — PROGRESS NOTES
Zo Barone tolerated Venofer treatment well with no complications.      Zo Barone is aware of future appt on 6/19/25 at 0800.     AVS declined, [facility]

## 2025-06-19 ENCOUNTER — HOSPITAL ENCOUNTER (OUTPATIENT)
Dept: INFUSION CENTER | Facility: HOSPITAL | Age: 40
Discharge: HOME | End: 2025-06-19
Attending: INTERNAL MEDICINE
Payer: COMMERCIAL

## 2025-06-19 DIAGNOSIS — N92.0 MENORRHAGIA WITH REGULAR CYCLE: Primary | ICD-10-CM

## 2025-06-19 DIAGNOSIS — D50.0 IRON DEFICIENCY ANEMIA DUE TO CHRONIC BLOOD LOSS: ICD-10-CM

## 2025-06-19 DIAGNOSIS — E53.8 VITAMIN B12 DEFICIENCY: ICD-10-CM

## 2025-06-19 PROCEDURE — 96372 THER/PROPH/DIAG INJ SC/IM: CPT

## 2025-06-19 PROCEDURE — 96365 THER/PROPH/DIAG IV INF INIT: CPT

## 2025-06-19 RX ORDER — SODIUM CHLORIDE 9 MG/ML
20 INJECTION, SOLUTION INTRAVENOUS ONCE
Status: CANCELLED | OUTPATIENT
Start: 2025-06-25

## 2025-06-19 RX ORDER — SODIUM CHLORIDE 9 MG/ML
20 INJECTION, SOLUTION INTRAVENOUS ONCE
Status: COMPLETED | OUTPATIENT
Start: 2025-06-19 | End: 2025-06-19

## 2025-06-19 RX ORDER — CYANOCOBALAMIN 1000 UG/ML
1000 INJECTION, SOLUTION INTRAMUSCULAR; SUBCUTANEOUS ONCE
Status: COMPLETED | OUTPATIENT
Start: 2025-06-19 | End: 2025-06-19

## 2025-06-19 RX ORDER — CYANOCOBALAMIN 1000 UG/ML
1000 INJECTION, SOLUTION INTRAMUSCULAR; SUBCUTANEOUS ONCE
Status: CANCELLED | OUTPATIENT
Start: 2025-06-25 | End: 2025-06-25

## 2025-06-19 RX ADMIN — CYANOCOBALAMIN 1000 MCG: 1000 INJECTION, SOLUTION INTRAMUSCULAR; SUBCUTANEOUS at 08:28

## 2025-06-19 RX ADMIN — SODIUM CHLORIDE 20 ML/HR: 0.9 INJECTION, SOLUTION INTRAVENOUS at 08:27

## 2025-06-19 RX ADMIN — IRON SUCROSE 200 MG: 20 INJECTION, SOLUTION INTRAVENOUS at 08:27

## 2025-06-19 NOTE — PROGRESS NOTES
Patient denies current infection or being on antibiotics.  Patient tolerated IV Venofer without reaction or issues.      Zo Barone is aware of future appt on 6/25/25 at 1400.     AVS-  No (Declined by Zo Barone)     Patient ambulated off unit without incident.  All personal belongings taken with patient.

## 2025-06-25 ENCOUNTER — HOSPITAL ENCOUNTER (OUTPATIENT)
Dept: INFUSION CENTER | Facility: HOSPITAL | Age: 40
Discharge: HOME | End: 2025-06-25
Attending: INTERNAL MEDICINE
Payer: COMMERCIAL

## 2025-06-25 VITALS
RESPIRATION RATE: 17 BRPM | HEART RATE: 60 BPM | DIASTOLIC BLOOD PRESSURE: 87 MMHG | TEMPERATURE: 97.3 F | SYSTOLIC BLOOD PRESSURE: 136 MMHG

## 2025-06-25 DIAGNOSIS — E53.8 VITAMIN B12 DEFICIENCY: ICD-10-CM

## 2025-06-25 DIAGNOSIS — N92.0 MENORRHAGIA WITH REGULAR CYCLE: Primary | ICD-10-CM

## 2025-06-25 DIAGNOSIS — D50.0 IRON DEFICIENCY ANEMIA DUE TO CHRONIC BLOOD LOSS: ICD-10-CM

## 2025-06-25 PROCEDURE — 96365 THER/PROPH/DIAG IV INF INIT: CPT

## 2025-06-25 PROCEDURE — 96372 THER/PROPH/DIAG INJ SC/IM: CPT

## 2025-06-25 RX ORDER — SODIUM CHLORIDE 9 MG/ML
20 INJECTION, SOLUTION INTRAVENOUS ONCE
Status: COMPLETED | OUTPATIENT
Start: 2025-06-25 | End: 2025-06-25

## 2025-06-25 RX ORDER — CYANOCOBALAMIN 1000 UG/ML
1000 INJECTION, SOLUTION INTRAMUSCULAR; SUBCUTANEOUS ONCE
Status: COMPLETED | OUTPATIENT
Start: 2025-06-25 | End: 2025-06-25

## 2025-06-25 RX ORDER — CYANOCOBALAMIN 1000 UG/ML
INJECTION, SOLUTION INTRAMUSCULAR; SUBCUTANEOUS
Status: COMPLETED
Start: 2025-06-25 | End: 2025-06-25

## 2025-06-25 RX ORDER — CYANOCOBALAMIN 1000 UG/ML
1000 INJECTION, SOLUTION INTRAMUSCULAR; SUBCUTANEOUS ONCE
Status: CANCELLED | OUTPATIENT
Start: 2025-07-07 | End: 2025-06-26

## 2025-06-25 RX ORDER — SODIUM CHLORIDE 9 MG/ML
20 INJECTION, SOLUTION INTRAVENOUS ONCE
Status: CANCELLED | OUTPATIENT
Start: 2025-07-07

## 2025-06-25 RX ADMIN — IRON SUCROSE 200 MG: 20 INJECTION, SOLUTION INTRAVENOUS at 14:17

## 2025-06-25 RX ADMIN — CYANOCOBALAMIN 1000 MCG: 1000 INJECTION, SOLUTION INTRAMUSCULAR; SUBCUTANEOUS at 14:51

## 2025-06-25 RX ADMIN — SODIUM CHLORIDE 20 ML/HR: 0.9 INJECTION, SOLUTION INTRAVENOUS at 14:17

## 2025-06-25 NOTE — PROGRESS NOTES
Zo Barone tolerated Venofer treatment well with no complications.      Zo Barone is aware of future appt on 7/7/25 at 1400.     AVS declined, patient uses MyChart.

## 2025-07-02 ENCOUNTER — HOSPITAL ENCOUNTER (OUTPATIENT)
Dept: INFUSION CENTER | Facility: HOSPITAL | Age: 40
End: 2025-07-02
Attending: INTERNAL MEDICINE

## 2025-07-07 ENCOUNTER — HOSPITAL ENCOUNTER (OUTPATIENT)
Dept: INFUSION CENTER | Facility: HOSPITAL | Age: 40
Discharge: HOME | End: 2025-07-07
Attending: INTERNAL MEDICINE
Payer: COMMERCIAL

## 2025-07-07 VITALS
OXYGEN SATURATION: 98 % | SYSTOLIC BLOOD PRESSURE: 132 MMHG | TEMPERATURE: 97.9 F | HEART RATE: 66 BPM | RESPIRATION RATE: 16 BRPM | DIASTOLIC BLOOD PRESSURE: 72 MMHG

## 2025-07-07 DIAGNOSIS — E53.8 VITAMIN B12 DEFICIENCY: ICD-10-CM

## 2025-07-07 DIAGNOSIS — N92.0 MENORRHAGIA WITH REGULAR CYCLE: Primary | ICD-10-CM

## 2025-07-07 DIAGNOSIS — D50.0 IRON DEFICIENCY ANEMIA DUE TO CHRONIC BLOOD LOSS: ICD-10-CM

## 2025-07-07 PROCEDURE — 96372 THER/PROPH/DIAG INJ SC/IM: CPT

## 2025-07-07 PROCEDURE — 96365 THER/PROPH/DIAG IV INF INIT: CPT

## 2025-07-07 RX ORDER — SODIUM CHLORIDE 9 MG/ML
20 INJECTION, SOLUTION INTRAVENOUS ONCE
Status: COMPLETED | OUTPATIENT
Start: 2025-07-07 | End: 2025-07-07

## 2025-07-07 RX ORDER — CYANOCOBALAMIN 1000 UG/ML
1000 INJECTION, SOLUTION INTRAMUSCULAR; SUBCUTANEOUS ONCE
Status: CANCELLED | OUTPATIENT
Start: 2025-07-09 | End: 2025-07-09

## 2025-07-07 RX ORDER — CYANOCOBALAMIN 1000 UG/ML
1000 INJECTION, SOLUTION INTRAMUSCULAR; SUBCUTANEOUS ONCE
Status: COMPLETED | OUTPATIENT
Start: 2025-07-07 | End: 2025-07-07

## 2025-07-07 RX ORDER — SODIUM CHLORIDE 9 MG/ML
20 INJECTION, SOLUTION INTRAVENOUS ONCE
Status: CANCELLED | OUTPATIENT
Start: 2025-07-09

## 2025-07-07 RX ADMIN — SODIUM CHLORIDE 200 MG: 9 INJECTION, SOLUTION INTRAVENOUS at 14:09

## 2025-07-07 RX ADMIN — CYANOCOBALAMIN 1000 MCG: 1000 INJECTION, SOLUTION INTRAMUSCULAR; SUBCUTANEOUS at 14:11

## 2025-07-07 RX ADMIN — SODIUM CHLORIDE 20 ML/HR: 0.9 INJECTION, SOLUTION INTRAVENOUS at 14:10

## 2025-07-07 NOTE — PROGRESS NOTES
Pt tolerated last ordered Venofer infusion and B12 injection in L deltoid well without any complications. PIV removed without incident.     Zo Barone does not need f/u appt with infusion at this time.      AVS declined by Zo Barone.     Pt discharged off unit in stable condition with all personal belongings.

## 2025-07-07 NOTE — PLAN OF CARE
Problem: Potential for Falls  Goal: Patient will remain free of falls  Description: INTERVENTIONS:  - Educate patient/family on patient safety including physical limitations  - Instruct patient to call for assistance with activity   - Consider consulting OT/PT to assist with strengthening/mobility based on AM PAC & JH-HLM score  - Consult OT/PT to assist with strengthening/mobility   - Keep Call bell within reach  - Keep bed low and locked with side rails adjusted as appropriate  - Keep care items and personal belongings within reach  - Initiate and maintain comfort rounds  - Consider moving patient to room near nurses station  Outcome: Progressing     Problem: INFECTION - ADULT  Goal: Absence or prevention of progression during hospitalization  Description: INTERVENTIONS:  - Assess and monitor for signs and symptoms of infection  - Monitor lab/diagnostic results  - Monitor all insertion sites, i.e. indwelling lines, tubes, and drains  - Monitor endotracheal if appropriate and nasal secretions for changes in amount and color  - [place] appropriate cooling/warming therapies per order  - Administer medications as ordered  - Instruct and encourage patient and family to use good hand hygiene technique  - Identify and instruct in appropriate isolation precautions for identified infection/condition  Outcome: Progressing     Problem: Knowledge Deficit  Goal: Patient/family/caregiver demonstrates understanding of disease process, treatment plan, medications, and discharge instructions  Description: Complete learning assessment and assess knowledge base.  Interventions:  - Provide teaching at level of understanding  - Provide teaching via preferred learning methods  Outcome: Progressing

## 2025-07-17 ENCOUNTER — OFFICE VISIT (OUTPATIENT)
Age: 40
End: 2025-07-17
Payer: COMMERCIAL

## 2025-07-17 VITALS
SYSTOLIC BLOOD PRESSURE: 147 MMHG | BODY MASS INDEX: 34.71 KG/M2 | DIASTOLIC BLOOD PRESSURE: 91 MMHG | HEART RATE: 69 BPM | TEMPERATURE: 98.3 F | OXYGEN SATURATION: 99 % | HEIGHT: 68 IN | RESPIRATION RATE: 16 BRPM | WEIGHT: 229 LBS

## 2025-07-17 DIAGNOSIS — G47.33 OSA (OBSTRUCTIVE SLEEP APNEA): Primary | ICD-10-CM

## 2025-07-17 DIAGNOSIS — E66.812 CLASS 2 SEVERE OBESITY WITH SERIOUS COMORBIDITY AND BODY MASS INDEX (BMI) OF 35.0 TO 35.9 IN ADULT, UNSPECIFIED OBESITY TYPE (HCC): ICD-10-CM

## 2025-07-17 DIAGNOSIS — E66.01 CLASS 2 SEVERE OBESITY WITH SERIOUS COMORBIDITY AND BODY MASS INDEX (BMI) OF 35.0 TO 35.9 IN ADULT, UNSPECIFIED OBESITY TYPE (HCC): ICD-10-CM

## 2025-07-17 PROCEDURE — 99214 OFFICE O/P EST MOD 30 MIN: CPT | Performed by: STUDENT IN AN ORGANIZED HEALTH CARE EDUCATION/TRAINING PROGRAM

## 2025-07-17 NOTE — ASSESSMENT & PLAN NOTE
Discussed the importance of maintaining a healthy weight on SDB control/management, and vice versa.  Encouraged lifestyle practices to maintain a healthy weight (including diet, exercise).  Reviewed that if patient ever wishes for a formal referral to Weight Management, they can let myself or their PCP know

## 2025-07-17 NOTE — PROGRESS NOTES
Name: Zo Barone      : 1985      MRN: [id number]  Encounter Provider: Chapo Garza DO  Encounter Date: 2025   Encounter department: [facility] SLEEP [facility]IC[facility] [place]  :  Assessment & Plan  ANUPAMA (obstructive sleep apnea)  Zo is a very pleasant 40-year-old woman with a PMHx of HTN, HLD, vitamin B12 deficiency, obesity who presents in follow up for ANUPAMA (JAYDEN 23.5, O2 elisa 75% 2025).  She is overall doing well with PAP therapy, although her subjective benefit is generally minimal (does endorse an overall reduction in respiratory events and nocturnal awakenings).  She has no major concerns for me today with regards to her mask or device.    Compliance report reviewed and analyzed  Continue AutoPAP at refined settings of 12-20 cmH2O.   Prescription for new supplies ordered today  Reviewed CMS/insurance requirements and resupply guidelines  Information provided on the above as well as general maintenance steps  Recommended maintaining good Sleep Hygiene         Class 2 severe obesity with serious comorbidity and body mass index (BMI) of 35.0 to 35.9 in adult, unspecified obesity type (HCC)    Discussed the importance of maintaining a healthy weight on SDB control/management, and vice versa.  Encouraged lifestyle practices to maintain a healthy weight (including diet, exercise).  Reviewed that if patient ever wishes for a formal referral to Weight Management, they can let myself or their PCP know           Follow-up:  She will Return in about 6 months (around 2026).      ________________________________________________________________________________________________    Per Last Visit Note (Date: 3/27/2025):  ANUPAMA (obstructive sleep apnea)  Zo is a very pleasant 40-year-old woman with PMHx of HTN who presents for ANUPAMA (JAYDEN 23.5, O2 leisa 75% 2025). While she has few symptoms of this, it is moderate in severity, and given the risks of untreated ANUPAMA coupled with her existing diagnosis of  HTN, I do recommend moving forward with treatment.      Discussed with patient the pathophysiology of OSAS and medical conditions associated with OSAS such as DM, HTN, CAD, Depression, Stroke, Headache.  Reviewed sleep study results at length with the patient today  Treatment options including surgery, Dental appliances, positional therapy, and CPAP were discussed.  Will start AutoPAP 5-20 cmH2O with a nasal mask..   Advised the patient that she should be receiving a call to schedule with their DME in ~2 weeks to receive the device.  Reviewed CMS/insurance criteria and resupply guidelines  Advised patient to avoid activities that could harm self or others when tired/sleepy, including driving.  Discussed importance of good sleep hygiene.  Of note, we did discuss that data has shown that taking oral iron every other day leads to better absorption than when taken daily, however I recommended she discuss this with her Hematology team.      Orders:    Ambulatory Referral to Sleep Medicine    Cpap DME     Class 2 severe obesity with serious comorbidity and body mass index (BMI) of 35.0 to 35.9 in adult, unspecified obesity type (HCC)     Discussed the importance of maintaining a healthy weight on SDB control/management, and vice versa.  Encouraged lifestyle practices to maintain a healthy weight (including diet, exercise).  Reviewed that if patient ever wishes for a formal referral to Weight Management, they can let myself or their PCP know     Orders:    Cpap DME      Sleep Studies:  -HSAT 2/4/2025: TRT: 519 minutes, JAYDEN: 23.5, O2 elisa: 75%      ________________________________________________________________________________________________      Interval History: Zo Barone is a 40 y.o. female with a PMHx of HTN, HLD, vitamin B12 deficiency, obesity who presents in follow up for ANUPAMA (JAYDEN 23.5, O2 elisa 75% 2/2025).      SDB:  -Current experience with PAP Therapy: She believes that the rerspiratory episodes are likely  "reduced, but otherwise doesn't note a huge subjective difference.   -Mask type: Full-face mask  -Difficulties with mask: Denies  -Device: ResMed AirSense 11; received 4/23/2025.  -Difficulties with device: Denies  -DME: [employer] [facility]  -Compliance:              SLEEP SCHEDULE:  Bedtime: 2230   Time it takes to fall sleep: <15 minutes  Wake up Time: 0630   Number of times patient wakes up per night: 0-1 \"every few days\"  Naps: 0  Estimated total sleep time ( in a 24 hour period of time): ~7-8 hours       Changes to PMH, PSH, SH: Dx with EOE following an EGD - needs to follow-up.         Sitting and reading: (Patient-Rptd) (P) Would never doze  Watching TV: (Patient-Rptd) (P) Moderate chance of dozing  Sitting, inactive in a public place (e.g. a theatre or a meeting): (Patient-Rptd) (P) Would never doze  As a passenger in a car for an hour without a break: (Patient-Rptd) (P) Slight chance of dozing  Lying down to rest in the afternoon when circumstances permit: (Patient-Rptd) (P) High chance of dozing  Sitting and talking to someone: (Patient-Rptd) (P) Would never doze  Sitting quietly after a lunch without alcohol: (Patient-Rptd) (P) Would never doze  In a car, while stopped for a few minutes in traffic: (Patient-Rptd) (P) Would never doze  Total score: (Patient-Rptd) (P) 6     Review of Systems  Pertinent positives/negatives included in HPI and also as noted:     Medications Ordered Prior to Encounter[1]   Objective   /91 (BP Location: Left arm, Patient Position: Sitting, Cuff Size: Large)   Pulse 69   Temp 98.3 °F (36.8 °C) (Temporal)   Resp 16   Ht 5' 8\" (1.727 m)   Wt 104 kg (229 lb)   SpO2 99%   BMI 34.82 kg/m²     Neck Circumference: 16.5  Physical Exam  Visit Vitals  /91 (BP Location: Left arm, Patient Position: Sitting, Cuff Size: Large)   Pulse 69   Temp 98.3 °F (36.8 °C) (Temporal)   Resp 16   Ht 5' 8\" (1.727 m)   Wt 104 kg (229 lb)   SpO2 99%   BMI 34.82 kg/m²   OB Status Unknown   Smoking " "Status Never   BSA 2.17 m²       PHYSICAL EXAMINATION:  Vital Signs: /91 (BP Location: Left arm, Patient Position: Sitting, Cuff Size: Large)   Pulse 69   Temp 98.3 °F (36.8 °C) (Temporal)   Resp 16   Ht 5' 8\" (1.727 m)   Wt 104 kg (229 lb)   SpO2 99%   BMI 34.82 kg/m²     Constitutional: NAD, well appearing   Mental Status: AAOx3  Skin: Warm, dry, no rashes noted   Eyes: PERRL, normal conjunctiva  Chest: No evidence of respiratory distress, no accessory muscle use; no evidence of peripheral cyanosis  Abdomen: Soft, NT/ND  Extremities: No digital clubbing or pedal edema  Neuro: Strength 5/5 throughout, sensation grossly intact      Data  Lab Results   Component Value Date    HGB 11.9 04/04/2025    HCT 37.8 04/04/2025    MCV 79 (L) 04/04/2025      Lab Results   Component Value Date    GLUCOSE 90 04/17/2015    CALCIUM 9.2 01/23/2025     04/17/2015    K 3.7 01/23/2025    CO2 28 01/23/2025     01/23/2025    BUN 6 01/23/2025    CREATININE 0.68 01/23/2025     Lab Results   Component Value Date    IRON 46 (L) 04/04/2025    TIBC 407.4 04/04/2025    FERRITIN 4 (L) 04/04/2025     Lab Results   Component Value Date    AST 19 11/16/2024    ALT 18 11/16/2024         Electronically signed by:    Chapo Garza DO  Board-Certified Neurology and Sleep Medicine  [facility]  07/17/25         [1]   Current Outpatient Medications on File Prior to Visit   Medication Sig Dispense Refill    carvedilol (COREG) 25 mg tablet Take 1 tablet (25 mg total) by mouth 2 (two) times a day with meals 180 tablet 3    lisinopril (ZESTRIL) 40 mg tablet TAKE 1 TABLET DAILY 90 tablet 1    Multiple Vitamin (MULTI-VITAMIN DAILY PO) Take by mouth      NIFEdipine (PROCARDIA XL) 30 mg 24 hr tablet Take 1 tablet (30 mg total) by mouth daily 90 tablet 3    pantoprazole (PROTONIX) 40 mg tablet       spironolactone (ALDACTONE) 25 mg tablet Take 1 tablet (25 mg total) by mouth daily 90 tablet 3    EPINEPHrine " (EPIPEN) 0.3 mg/0.3 mL SOAJ Inject 0.3 mL (0.3 mg total) into a muscle once for 1 dose 0.6 mL 0    ferrous sulfate 324 (65 Fe) mg Take 1 tablet (324 mg total) by mouth 2 (two) times a day before meals 180 tablet 1    fexofenadine-pseudoephedrine (ALLEGRA-D 24) 180-240 MG per 24 hr tablet Take 1 tablet by mouth in the morning. (Patient not taking: Reported on 5/30/2025)       No current facility-administered medications on file prior to visit.

## 2025-07-17 NOTE — PATIENT INSTRUCTIONS
Plan:  Continue AutoPAP at adjusted settings of 12-20 cmH2O  Remember to clean your mask and equipment regularly, as directed.  You should be eligible for new supplies approximately every 3-6 months, depending on your insurance coverage. Contact your Durable Medical Equipment (DME) company for new supplies as needed.  Practice good Sleep Hygiene, as outlined below.  Follow up in 6 months.      General PAP Information:  Care and Maintenance  Headgear should be washed as needed. Daily inspection and weekly washings are recommended. Do not disassemble the straps. Machine wash in warm water, making sure to attach Velcro hooks and tabs before washing. Line dry or machine dry on a low setting.  Masks should be washed every day. Daily inspection is recommended. Leave the mask and tubing attached. Gently wash the mask with a soft cloth using warm water and mild detergent, concentrating on the mask cushion flaps. DO NOT use alcohol or bleach. Rinse thoroughly and air dry.  Tubing and headgear should be washed weekly. Daily inspection is recommended. Wash in warm water and mild detergent and rinse thoroughly. Hook the tubing to the machine and blow until dry.  Humidifier should be washed daily and filled with DISTILLED water before use. Wash with warm water and mild detergent. Rinse thoroughly and air dry.  Disposable filters should be replaced once a month. Wash reusable foam filters with warm water and mild detergent at least once a month. Rinse thoroughly and dry with paper towels.  Avoid  that contain fragrance or conditioners, as these will leave a residue.  NEVER iron any soft goods.      CMS Requirements    Your insurance requires a face-to-face follow up visit within a 31-90 day period after starting CPAP.  Your insurance requires compliance with CPAP, which is at least 4 hours per night for 70% of the time. This must be done over a 30 day period and must occur within the initial 31-90 day period after  starting CPAP.  Your insurance also requires at least yearly follow ups to continue to pay for CPAP supplies.       PAP Supply Guidelines    Below are the guidelines for reordering your supplies. You will be responsible for your deductible, co payments, and out of pocket expenses.    Item Frequency   Nasal Mask (no headgear) 1 every 3 months   Nasal Mask Cushion 1 every 2 weeks   Full Face Mask (no headgear) 1 every 3 months   Full Face Mask Cushion 1 every month   Nasal Pillows Cushion 1 every 2 weeks   Headgear 1 every 6 months   hin Strap 1 every 6 months   kevyn 1 every 3 months   Filters: Reusable 1 every 6 months   Filters: Disposable 1 every 2 weeks   Humidifier Chamber(disposable) 1 every 6 months           Good Sleep Hygiene  Wake up at the same time every day, even on the weekends.  Use your bed for sleep and intimacy only.  If you have been in bed awake for 30 minutes, get up and leave the bedroom. Choose a dull activity not involving a blue screen (TV, computer, handheld devices). Go back to bed when you feel sleepy.  Avoid caffeine, nicotine and alcohol before you go to bed.  Avoid large meals before you go to bed.  Avoid using screens (computers, tablets, smartphones, etc.) for at least 1 hour before bedtime  Exercise regularly, but do not exercise right before you go to bed.  Avoid daytime naps. If you do take a nap, sleep for 20-40 minutes, and not after 2pm.

## 2025-07-17 NOTE — ASSESSMENT & PLAN NOTE
Zo is a very pleasant 40-year-old woman with a PMHx of HTN, HLD, vitamin B12 deficiency, obesity who presents in follow up for ANUPAMA (JAYDEN 23.5, O2 elisa 75% 2/2025).  She is overall doing well with PAP therapy, although her subjective benefit is generally minimal (does endorse an overall reduction in respiratory events and nocturnal awakenings).  She has no major concerns for me today with regards to her mask or device.    Compliance report reviewed and analyzed  Continue AutoPAP at refined settings of 12-20 cmH2O.   Prescription for new supplies ordered today  Reviewed CMS/insurance requirements and resupply guidelines  Information provided on the above as well as general maintenance steps  Recommended maintaining good Sleep Hygiene

## 2025-07-18 ENCOUNTER — TELEPHONE (OUTPATIENT)
Dept: SLEEP CENTER | Facility: CLINIC | Age: 40
End: 2025-07-18

## 2025-08-01 DIAGNOSIS — I10 BENIGN ESSENTIAL HYPERTENSION: Primary | ICD-10-CM

## 2025-08-18 ENCOUNTER — APPOINTMENT (OUTPATIENT)
Dept: LAB | Facility: CLINIC | Age: 40
End: 2025-08-18
Payer: COMMERCIAL

## 2025-08-18 DIAGNOSIS — N92.0 MENORRHAGIA WITH REGULAR CYCLE: ICD-10-CM

## 2025-08-18 DIAGNOSIS — I10 BENIGN ESSENTIAL HYPERTENSION: ICD-10-CM

## 2025-08-18 DIAGNOSIS — D50.0 IRON DEFICIENCY ANEMIA DUE TO CHRONIC BLOOD LOSS: ICD-10-CM

## 2025-08-18 DIAGNOSIS — E53.8 VITAMIN B12 DEFICIENCY: ICD-10-CM

## 2025-08-18 DIAGNOSIS — Z00.00 ANNUAL PHYSICAL EXAM: ICD-10-CM

## 2025-08-18 LAB
ALBUMIN SERPL BCG-MCNC: 4.4 G/DL (ref 3.5–5)
ALP SERPL-CCNC: 75 U/L (ref 34–104)
ALT SERPL W P-5'-P-CCNC: 22 U/L (ref 7–52)
ANION GAP SERPL CALCULATED.3IONS-SCNC: 7 MMOL/L (ref 4–13)
AST SERPL W P-5'-P-CCNC: 17 U/L (ref 13–39)
BILIRUB SERPL-MCNC: 0.74 MG/DL (ref 0.2–1)
BUN SERPL-MCNC: 12 MG/DL (ref 5–25)
CALCIUM SERPL-MCNC: 9.2 MG/DL (ref 8.4–10.2)
CHLORIDE SERPL-SCNC: 102 MMOL/L (ref 96–108)
CHOLEST SERPL-MCNC: 141 MG/DL (ref ?–200)
CO2 SERPL-SCNC: 30 MMOL/L (ref 21–32)
CREAT SERPL-MCNC: 0.66 MG/DL (ref 0.6–1.3)
ERYTHROCYTE [DISTWIDTH] IN BLOOD BY AUTOMATED COUNT: 14.5 % (ref 11.6–15.1)
FERRITIN SERPL-MCNC: 36 NG/ML (ref 30–307)
GFR SERPL CREATININE-BSD FRML MDRD: 111 ML/MIN/1.73SQ M
GLUCOSE P FAST SERPL-MCNC: 94 MG/DL (ref 65–99)
HCT VFR BLD AUTO: 42.6 % (ref 34.8–46.1)
HDLC SERPL-MCNC: 52 MG/DL
HGB BLD-MCNC: 13.3 G/DL (ref 11.5–15.4)
IRON SATN MFR SERPL: 29 % (ref 15–50)
IRON SERPL-MCNC: 108 UG/DL (ref 50–212)
LDLC SERPL CALC-MCNC: 75 MG/DL (ref 0–100)
MCH RBC QN AUTO: 26.6 PG (ref 26.8–34.3)
MCHC RBC AUTO-ENTMCNC: 31.2 G/DL (ref 31.4–37.4)
MCV RBC AUTO: 85 FL (ref 82–98)
NONHDLC SERPL-MCNC: 89 MG/DL
PLATELET # BLD AUTO: 296 THOUSANDS/UL (ref 149–390)
PMV BLD AUTO: 11.3 FL (ref 8.9–12.7)
POTASSIUM SERPL-SCNC: 3.8 MMOL/L (ref 3.5–5.3)
PROT SERPL-MCNC: 7.2 G/DL (ref 6.4–8.4)
RBC # BLD AUTO: 5 MILLION/UL (ref 3.81–5.12)
SODIUM SERPL-SCNC: 139 MMOL/L (ref 135–147)
TIBC SERPL-MCNC: 378 UG/DL (ref 250–450)
TRANSFERRIN SERPL-MCNC: 270 MG/DL (ref 203–362)
TRIGL SERPL-MCNC: 69 MG/DL (ref ?–150)
UIBC SERPL-MCNC: 270 UG/DL (ref 155–355)
VIT B12 SERPL-MCNC: 938 PG/ML (ref 180–914)
WBC # BLD AUTO: 5.3 THOUSAND/UL (ref 4.31–10.16)

## 2025-08-18 PROCEDURE — 82607 VITAMIN B-12: CPT

## 2025-08-18 PROCEDURE — 80061 LIPID PANEL: CPT

## 2025-08-18 PROCEDURE — 83540 ASSAY OF IRON: CPT

## 2025-08-18 PROCEDURE — 85027 COMPLETE CBC AUTOMATED: CPT

## 2025-08-18 PROCEDURE — 82728 ASSAY OF FERRITIN: CPT

## 2025-08-18 PROCEDURE — 36415 COLL VENOUS BLD VENIPUNCTURE: CPT

## 2025-08-18 PROCEDURE — 83550 IRON BINDING TEST: CPT

## 2025-08-18 PROCEDURE — 80053 COMPREHEN METABOLIC PANEL: CPT

## 2025-08-21 ENCOUNTER — VBI (OUTPATIENT)
Dept: ADMINISTRATIVE | Facility: OTHER | Age: 40
End: 2025-08-21